# Patient Record
Sex: FEMALE | Race: WHITE | NOT HISPANIC OR LATINO | Employment: OTHER | URBAN - METROPOLITAN AREA
[De-identification: names, ages, dates, MRNs, and addresses within clinical notes are randomized per-mention and may not be internally consistent; named-entity substitution may affect disease eponyms.]

---

## 2018-09-23 ENCOUNTER — APPOINTMENT (OUTPATIENT)
Dept: ULTRASOUND IMAGING | Facility: CLINIC | Age: 78
End: 2018-09-23
Attending: EMERGENCY MEDICINE
Payer: COMMERCIAL

## 2018-09-23 ENCOUNTER — APPOINTMENT (OUTPATIENT)
Dept: GENERAL RADIOLOGY | Facility: CLINIC | Age: 78
End: 2018-09-23
Attending: EMERGENCY MEDICINE
Payer: COMMERCIAL

## 2018-09-23 ENCOUNTER — HOSPITAL ENCOUNTER (EMERGENCY)
Facility: CLINIC | Age: 78
Discharge: HOME | End: 2018-09-24
Attending: EMERGENCY MEDICINE | Admitting: EMERGENCY MEDICINE
Payer: COMMERCIAL

## 2018-09-23 DIAGNOSIS — M79.661 PAIN OF RIGHT LOWER LEG: ICD-10-CM

## 2018-09-23 DIAGNOSIS — R07.89 CHEST WALL PAIN: ICD-10-CM

## 2018-09-23 LAB
ANION GAP SERPL CALCULATED.3IONS-SCNC: 8 MMOL/L (ref 3–14)
BASOPHILS # BLD AUTO: 0 10E9/L (ref 0–0.2)
BASOPHILS NFR BLD AUTO: 0.3 %
BUN SERPL-MCNC: 16 MG/DL (ref 7–30)
CALCIUM SERPL-MCNC: 8.2 MG/DL (ref 8.5–10.1)
CHLORIDE SERPL-SCNC: 109 MMOL/L (ref 94–109)
CO2 SERPL-SCNC: 25 MMOL/L (ref 20–32)
CREAT SERPL-MCNC: 0.81 MG/DL (ref 0.52–1.04)
DIFFERENTIAL METHOD BLD: NORMAL
EOSINOPHIL # BLD AUTO: 0.2 10E9/L (ref 0–0.7)
EOSINOPHIL NFR BLD AUTO: 3.1 %
ERYTHROCYTE [DISTWIDTH] IN BLOOD BY AUTOMATED COUNT: 13.2 % (ref 10–15)
GFR SERPL CREATININE-BSD FRML MDRD: 68 ML/MIN/1.7M2
GLUCOSE SERPL-MCNC: 101 MG/DL (ref 70–99)
HCT VFR BLD AUTO: 41.3 % (ref 35–47)
HGB BLD-MCNC: 13.9 G/DL (ref 11.7–15.7)
IMM GRANULOCYTES # BLD: 0 10E9/L (ref 0–0.4)
IMM GRANULOCYTES NFR BLD: 0.3 %
LYMPHOCYTES # BLD AUTO: 2.1 10E9/L (ref 0.8–5.3)
LYMPHOCYTES NFR BLD AUTO: 27.4 %
MCH RBC QN AUTO: 31 PG (ref 26.5–33)
MCHC RBC AUTO-ENTMCNC: 33.7 G/DL (ref 31.5–36.5)
MCV RBC AUTO: 92 FL (ref 78–100)
MONOCYTES # BLD AUTO: 1.1 10E9/L (ref 0–1.3)
MONOCYTES NFR BLD AUTO: 13.8 %
NEUTROPHILS # BLD AUTO: 4.2 10E9/L (ref 1.6–8.3)
NEUTROPHILS NFR BLD AUTO: 55.1 %
NRBC # BLD AUTO: 0 10*3/UL
NRBC BLD AUTO-RTO: 0 /100
PLATELET # BLD AUTO: 234 10E9/L (ref 150–450)
POTASSIUM SERPL-SCNC: 3.9 MMOL/L (ref 3.4–5.3)
RBC # BLD AUTO: 4.48 10E12/L (ref 3.8–5.2)
SODIUM SERPL-SCNC: 142 MMOL/L (ref 133–144)
TROPONIN I SERPL-MCNC: <0.015 UG/L (ref 0–0.04)
WBC # BLD AUTO: 7.7 10E9/L (ref 4–11)

## 2018-09-23 PROCEDURE — 93971 EXTREMITY STUDY: CPT | Mod: RT

## 2018-09-23 PROCEDURE — 93005 ELECTROCARDIOGRAM TRACING: CPT

## 2018-09-23 PROCEDURE — 80048 BASIC METABOLIC PNL TOTAL CA: CPT | Performed by: EMERGENCY MEDICINE

## 2018-09-23 PROCEDURE — 85025 COMPLETE CBC W/AUTO DIFF WBC: CPT | Performed by: EMERGENCY MEDICINE

## 2018-09-23 PROCEDURE — 84484 ASSAY OF TROPONIN QUANT: CPT | Performed by: EMERGENCY MEDICINE

## 2018-09-23 PROCEDURE — 99285 EMERGENCY DEPT VISIT HI MDM: CPT | Mod: 25

## 2018-09-23 PROCEDURE — 71046 X-RAY EXAM CHEST 2 VIEWS: CPT

## 2018-09-23 RX ORDER — TRAMADOL HYDROCHLORIDE 50 MG/1
TABLET ORAL
COMMUNITY
Start: 2018-08-21

## 2018-09-23 RX ORDER — TOPIRAMATE 25 MG/1
TABLET, FILM COATED ORAL
COMMUNITY
Start: 2018-07-20

## 2018-09-23 RX ORDER — PRAMIPEXOLE DIHYDROCHLORIDE 0.5 MG/1
TABLET ORAL
COMMUNITY
Start: 2018-08-17

## 2018-09-23 RX ORDER — NYSTATIN 100000 [USP'U]/G
POWDER TOPICAL
COMMUNITY
Start: 2017-07-20

## 2018-09-23 RX ORDER — MINOCYCLINE HYDROCHLORIDE 100 MG/1
100 CAPSULE ORAL
COMMUNITY
Start: 2018-06-20

## 2018-09-23 RX ORDER — CYCLOSPORINE 0.5 MG/ML
EMULSION OPHTHALMIC
COMMUNITY
Start: 2018-03-01

## 2018-09-23 RX ORDER — HYDROCORTISONE 2.5 %
CREAM (GRAM) TOPICAL
COMMUNITY
Start: 2018-03-01

## 2018-09-23 RX ORDER — LORAZEPAM 0.5 MG/1
TABLET ORAL
COMMUNITY
Start: 2018-03-01

## 2018-09-23 RX ORDER — PANTOPRAZOLE SODIUM 40 MG/1
40 TABLET, DELAYED RELEASE ORAL
COMMUNITY
Start: 2018-05-15

## 2018-09-23 RX ORDER — ACETAMINOPHEN 500 MG
1000 TABLET ORAL ONCE
Status: DISCONTINUED | OUTPATIENT
Start: 2018-09-23 | End: 2018-09-24 | Stop reason: HOSPADM

## 2018-09-23 RX ORDER — METRONIDAZOLE 10 MG/G
GEL TOPICAL
COMMUNITY
Start: 2009-12-21

## 2018-09-23 RX ORDER — VALACYCLOVIR HYDROCHLORIDE 1 G/1
1 TABLET, FILM COATED ORAL
COMMUNITY
Start: 2018-06-20

## 2018-09-23 RX ORDER — GABAPENTIN 100 MG/1
CAPSULE ORAL
Status: ON HOLD | COMMUNITY
Start: 2018-03-01 | End: 2021-02-15

## 2018-09-23 RX ORDER — IBUPROFEN 200 MG
TABLET ORAL
COMMUNITY
Start: 2018-03-01

## 2018-09-23 RX ORDER — TRAZODONE HYDROCHLORIDE 100 MG/1
200 TABLET ORAL
COMMUNITY
Start: 2018-08-07

## 2018-09-23 ASSESSMENT — ENCOUNTER SYMPTOMS
MYALGIAS: 1
FEVER: 0
COUGH: 0

## 2018-09-23 NOTE — ED AVS SNAPSHOT
Emergency Department    [address] [place] [address]    Phone:  [phone]    Fax:  [phone]                                       Elvis Cruz   MRN: [id number]    Department:   Emergency Department   Date of Visit:  9/23/2018           After Visit Summary Signature Page     I have received my discharge instructions, and my questions have been answered. I have discussed any challenges I see with this plan with the nurse or doctor.    ..........................................................................................................................................  Patient/Patient Representative Signature      ..........................................................................................................................................  Patient Representative Print Name and Relationship to Patient    ..................................................               ................................................  Date                                   Time    ..........................................................................................................................................  Reviewed by Signature/Title    ...................................................              ..............................................  Date                                               Time          22EPIC Rev 08/18

## 2018-09-23 NOTE — ED AVS SNAPSHOT
Emergency Department    [address] [facility] [address]    Phone:  [phone]    Fax:  [phone]                                       Elvis Cruz   MRN: [id number]    Department:   Emergency Department   Date of Visit:  9/23/2018           Patient Information     Date Of Birth          1940        Your diagnoses for this visit were:     Chest wall pain     Pain of right lower leg        You were seen by Chato Hamilton MD.      Follow-up Information     Follow up with Mukesh Briggs MD. Schedule an appointment as soon as possible for a visit in 2 days.    Specialty:  Family Practice    Why:  For close follow up    Contact information:    [facility]  [address] [facility] [address]  [facility] [address]  [phone]          Go to  Emergency Department.    Specialty:  EMERGENCY MEDICINE    Why:  If symptoms worsen    Contact information:    [address] [facility] [address] [phone]        Discharge Instructions       Discharge Instructions  Chest Pain    You have been seen today for chest pain or discomfort.  At this time, your provider has found no signs that your chest pain is due to a serious or life-threatening condition, (or you have declined more testing and/or admission to the hospital). However, sometimes there is a serious problem that does not show up right away. Your evaluation today may not be complete and you may need further testing and evaluation.     Generally, every Emergency Department visit should have a follow-up clinic visit with either a primary or a specialty clinic/provider. Please follow-up as instructed by your emergency provider today.  Return to the Emergency Department if:    Your chest pain changes, gets worse, starts to happen more often, or comes with less activity.    You are newly short of breath.    You get very weak or tired.    You pass out or faint.    You have any new symptoms, like fever, cough, numb  legs, or you cough up blood.    You have anything else that worries you.    Until you follow-up with your regular provider, please do the following:    Take one aspirin daily unless you have an allergy or are told not to by your provider.    If a stress test appointment has been made, go to the appointment.    If you have questions, contact your regular provider.    Follow-up with your regular provider/clinic as directed; this is very important.    If you were given a prescription for medicine here today, be sure to read all of the information (including the package insert) that comes with your prescription.  This will include important information about the medicine, its side effects, and any warnings that you need to know about.  The pharmacist who fills the prescription can provide more information and answer questions you may have about the medicine.  If you have questions or concerns that the pharmacist cannot address, please call or return to the Emergency Department.       Remember that you can always come back to the Emergency Department if you are not able to see your regular provider in the amount of time listed above, if you get any new symptoms, or if there is anything that worries you.      24 Hour Appointment Hotline       To make an appointment at any [facility], call [phone] ([phone]). If you don't have a family doctor or clinic, we will help you find one. [facility] clinics are conveniently located to serve the needs of you and your family.             Review of your medicines      Our records show that you are taking the medicines listed below. If these are incorrect, please call your family doctor or clinic.        Dose / Directions Last dose taken    ALPRAZOLAM PO        Take  by mouth.   Refills:  0        cycloSPORINE 0.05 % ophthalmic emulsion   Commonly known as:  RESTASIS        2 (two) times a day. Both eyes   Refills:  0        diphenhydrAMINE-acetaminophen  MG  tablet   Commonly known as:  TYLENOL PM   Dose:  1 tablet        Take 1 tablet by mouth   Refills:  0        gabapentin 100 MG capsule   Commonly known as:  NEURONTIN        Refills:  0        hydrocortisone 2.5 % cream        Refills:  0        ibuprofen 200 MG tablet   Commonly known as:  ADVIL/MOTRIN        Refills:  0        LORazepam 0.5 MG tablet   Commonly known as:  ATIVAN        Refills:  0        metroNIDAZOLE 1 % gel   Commonly known as:  METROGEL        Refills:  0        * minocycline 100 MG capsule   Commonly known as:  MINOCIN/DYNACIN        Take  by mouth 2 times daily.   Refills:  0        * minocycline 100 MG capsule   Commonly known as:  MINOCIN/DYNACIN   Dose:  100 mg        Take 100 mg by mouth   Refills:  0        Multi-vitamin Tabs tablet   Dose:  1 tablet        Take 1 tablet by mouth daily.   Refills:  0        nystatin 585759 UNIT/GM Powd   Commonly known as:  MYCOSTATIN        Refills:  0        * PANTOPRAZOLE SODIUM PO        Take  by mouth.   Refills:  0        * pantoprazole 40 MG EC tablet   Commonly known as:  PROTONIX   Dose:  40 mg        Take 40 mg by mouth   Refills:  0        pramipexole 0.5 MG tablet   Commonly known as:  MIRAPEX        Refills:  0        ranitidine 300 MG tablet   Commonly known as:  ZANTAC   Dose:  300 mg        Take 300 mg by mouth   Refills:  0        ROPINIROLE HCL PO        Take  by mouth.   Refills:  0        topiramate 25 MG tablet   Commonly known as:  TOPAMAX        Take up to 100 MG daily.   Refills:  0        traMADol 50 MG tablet   Commonly known as:  ULTRAM        Refills:  0        traZODone 100 MG tablet   Commonly known as:  DESYREL   Dose:  200 mg        Take 200 mg by mouth   Refills:  0        valACYclovir 1000 mg tablet   Commonly known as:  VALTREX   Dose:  1 g        Take 1 g by mouth   Refills:  0        * Notice:  This list has 4 medication(s) that are the same as other medications prescribed for you. Read the directions carefully, and  ask your doctor or other care provider to review them with you.            Information about OPIOIDS     PRESCRIPTION OPIOIDS: WHAT YOU NEED TO KNOW   We gave you an opioid (narcotic) pain medicine. It is important to manage your pain, but opioids are not always the best choice. You should first try all the other options your care team gave you. Take this medicine for as short a time (and as few doses) as possible.    Some activities can increase your pain, such as bandage changes or therapy sessions. It may help to take your pain medicine 30 to 60 minutes before these activities. Reduce your stress by getting enough sleep, working on hobbies you enjoy and practicing relaxation or meditation. Talk to your care team about ways to manage your pain beyond prescription opioids.    These medicines have risks:    DO NOT drive when on new or higher doses of pain medicine. These medicines can affect your alertness and reaction times, and you could be arrested for driving under the influence (DUI). If you need to use opioids long-term, talk to your care team about driving.    DO NOT operate heavy machinery    DO NOT do any other dangerous activities while taking these medicines.    DO NOT drink any alcohol while taking these medicines.     If the opioid prescribed includes acetaminophen, DO NOT take with any other medicines that contain acetaminophen. Read all labels carefully. Look for the word  acetaminophen  or  Tylenol.  Ask your pharmacist if you have questions or are unsure.    You can get addicted to pain medicines, especially if you have a history of addiction (chemical, alcohol or substance dependence). Talk to your care team about ways to reduce this risk.    All opioids tend to cause constipation. Drink plenty of water and eat foods that have a lot of fiber, such as fruits, vegetables, prune juice, apple juice and high-fiber cereal. Take a laxative (Miralax, milk of magnesia, Colace, Senna) if you don t move your  bowels at least every other day. Other side effects include upset stomach, sleepiness, dizziness, throwing up, tolerance (needing more of the medicine to have the same effect), physical dependence and slowed breathing.    Store your pills in a secure place, locked if possible. We will not replace any lost or stolen medicine. If you don t finish your medicine, please throw away (dispose) as directed by your pharmacist. The [place] [employer]ution [employer] has more information about safe disposal: [email or web address]/living-[email or web address]/[email or web address]-medications        Procedures and tests performed during your visit     Basic metabolic panel    CBC with platelets differential    Peripheral IV catheter    Troponin I    US Lower Extremity Venous Duplex Right    XR Chest 2 Views      Orders Needing Specimen Collection     None      Pending Results     Date and Time Order Name Status Description    9/23/2018 2231 US Lower Extremity Venous Duplex Right Preliminary             Pending Culture Results     No orders found for last 3 day(s).            Pending Results Instructions     If you had any lab results that were not finalized at the time of your Discharge, you can call the ED Lab Result RN at [phone]. You will be contacted by this team for any positive Lab results or changes in treatment. The nurses are available 7 days a week from 10A to 6:30P.  You can leave a message 24 hours per day and they will return your call.        Test Results From Your Hospital Stay        9/23/2018 10:53 PM      Component Results     Component Value Ref Range & Units Status    WBC 7.7 4.0 - 11.0 10e9/L Final    RBC Count 4.48 3.8 - 5.2 10e12/L Final    Hemoglobin 13.9 11.7 - 15.7 g/dL Final    Hematocrit 41.3 35.0 - 47.0 % Final    MCV 92 78 - 100 fl Final    MCH 31.0 26.5 - 33.0 pg Final    MCHC 33.7 31.5 - 36.5 g/dL Final    RDW 13.2 10.0 - 15.0 % Final    Platelet Count 234 150 - 450 10e9/L Final    Diff Method Automated  Method  Final    % Neutrophils 55.1 % Final    % Lymphocytes 27.4 % Final    % Monocytes 13.8 % Final    % Eosinophils 3.1 % Final    % Basophils 0.3 % Final    % Immature Granulocytes 0.3 % Final    Nucleated RBCs 0 0 /100 Final    Absolute Neutrophil 4.2 1.6 - 8.3 10e9/L Final    Absolute Lymphocytes 2.1 0.8 - 5.3 10e9/L Final    Absolute Monocytes 1.1 0.0 - 1.3 10e9/L Final    Absolute Eosinophils 0.2 0.0 - 0.7 10e9/L Final    Absolute Basophils 0.0 0.0 - 0.2 10e9/L Final    Abs Immature Granulocytes 0.0 0 - 0.4 10e9/L Final    Absolute Nucleated RBC 0.0  Final         9/23/2018 11:06 PM      Component Results     Component Value Ref Range & Units Status    Sodium 142 133 - 144 mmol/L Final    Potassium 3.9 3.4 - 5.3 mmol/L Final    Chloride 109 94 - 109 mmol/L Final    Carbon Dioxide 25 20 - 32 mmol/L Final    Anion Gap 8 3 - 14 mmol/L Final    Glucose 101 (H) 70 - 99 mg/dL Final    Urea Nitrogen 16 7 - 30 mg/dL Final    Creatinine 0.81 0.52 - 1.04 mg/dL Final    GFR Estimate 68 >60 mL/min/1.7m2 Final    Non  GFR Calc    GFR Estimate If Black 82 >60 mL/min/1.7m2 Final    African American GFR Calc    Calcium 8.2 (L) 8.5 - 10.1 mg/dL Final         9/23/2018 11:11 PM      Component Results     Component Value Ref Range & Units Status    Troponin I ES <0.015 0.000 - 0.045 ug/L Final    The 99th percentile for upper reference range is 0.045 ug/L.  Troponin values   in the range of 0.045 - 0.120 ug/L may be associated with risks of adverse   clinical events.           9/23/2018 11:04 PM      Narrative     XR CHEST 2 VIEWS   9/23/2018 10:49 PM     HISTORY: Chest pain.     COMPARISON: 7/18/2013.     FINDINGS: Cardiomediastinal silhouette within normal limits. No focal  airspace opacities. No pleural effusion. No pneumothorax identified.  The bones are unremarkable.         Impression     IMPRESSION: No acute cardiopulmonary abnormalities.    TAMMI GORDON MD         9/23/2018 11:56 PM      Narrative      ULTRASOUND VENOUS RIGHT LOWER EXTREMITY WITH DOPPLER   9/23/2018 11:36  PM     HISTORY: Right leg pain.    COMPARISON: None.    TECHNIQUE: Spectral waveform and color Doppler evaluation were  performed.    FINDINGS: Normal compressibility of the right common femoral, femoral,  popliteal, posterior tibial, peroneal and greater saphenous veins.  Unremarkable Doppler waveform evaluation of the right common femoral,  femoral and popliteal veins.        Impression     IMPRESSION: No evidence of thrombus in the major veins of the right  lower extremity.                Clinical Quality Measure: Blood Pressure Screening     Your blood pressure was checked while you were in the emergency department today. The last reading we obtained was  BP: 154/70 . Please read the guidelines below about what these numbers mean and what you should do about them.  If your systolic blood pressure (the top number) is less than 120 and your diastolic blood pressure (the bottom number) is less than 80, then your blood pressure is normal. There is nothing more that you need to do about it.  If your systolic blood pressure (the top number) is 120-139 or your diastolic blood pressure (the bottom number) is 80-89, your blood pressure may be higher than it should be. You should have your blood pressure rechecked within a year by a primary care provider.  If your systolic blood pressure (the top number) is 140 or greater or your diastolic blood pressure (the bottom number) is 90 or greater, you may have high blood pressure. High blood pressure is treatable, but if left untreated over time it can put you at risk for heart attack, stroke, or kidney failure. You should have your blood pressure rechecked by a primary care provider within the next 4 weeks.  If your provider in the emergency department today gave you specific instructions to follow-up with your doctor or provider even sooner than that, you should follow that instruction and not wait for up  "to 4 weeks for your follow-up visit.        Thank you for choosing [facility]       Thank you for choosing [facility] for your care. Our goal is always to provide you with excellent care. Hearing back from our patients is one way we can continue to improve our services. Please take a few minutes to complete the written survey that you may receive in the mail after you visit with us. Thank you!        [email or web address] Information     [employer] lets you send messages to your doctor, view your test results, renew your prescriptions, schedule appointments and more. To sign up, go to [email or web address] . Click on \"Log in\" on the left side of the screen, which will take you to the Welcome page. Then click on \"Sign up Now\" on the right side of the page.     You will be asked to enter the access code listed below, as well as some personal information. Please follow the directions to create your username and password.     Your access code is: [email or web address]  Expires: 2018 12:17 AM     Your access code will  in 90 days. If you need help or a new code, please call your [facility] clinic or [phone].        Care EveryWhere ID     This is your Care EveryWhere ID. This could be used by other organizations to access your [facility] medical records  [id number]        Equal Access to Services     ALBERT FARNSWORTH AH: Grant Marquez, waaxda luqadaha, qaybta kaalmada adeamirah, rodolfo dickens. So [facility] [phone].    ATENCIÓN: Si habla español, tiene a morales disposición servicios gratuitos de asistencia lingüística. Llame al [phone].    We comply with applicable federal civil rights laws and [place] laws. We do not discriminate on the basis of race, color, national origin, age, disability, sex, sexual orientation, or gender identity.            After Visit Summary       This is your record. Keep this with you and show to your community pharmacist(s) and doctor(s) at your next visit.  "

## 2018-09-24 VITALS
DIASTOLIC BLOOD PRESSURE: 70 MMHG | SYSTOLIC BLOOD PRESSURE: 154 MMHG | OXYGEN SATURATION: 98 % | RESPIRATION RATE: 18 BRPM | TEMPERATURE: 98.3 F

## 2018-09-24 LAB — INTERPRETATION ECG - MUSE: NORMAL

## 2018-09-24 NOTE — ED PROVIDER NOTES
"  History     Chief Complaint:  Chest Pain     HPI   Elvis Cruz is a 77 year old female who presents to the emergency department today for evaluation of chest pain. The patient reports she has been having intermittent chest pain, and developed a stinging in her right lower leg. She says this could be due to her anxiety that she is having over her MRI and EEG tomorrow to evaluate her for ongoing unexplained \"blackout episodes.\" She notes she has had muscle tightness and tension in her right neck and shoulder then 30 minutes prior to arrival she developed right upper chest pain that is nonpleuritic, nonexertional and lasts for \"a minute\" at a time. Due to these symptoms she presented to the emergency department today. She says that deep breathes do not worsen her chest pain. She said there was no trauma or fall to have caused her chest or leg pain. She denies coughing, fever, and shortness of breath, syncope. She has no history of heart disease. Of note, she is currently not taking all of her medications after doctors request for the upcoming tests she is having done for her blackout episodes. She denies any syncope, fever or recent illness. She denies shortness of breath. She denies leg swelling or redness.       Allergies:  No Known Drug Allergies        Medications:    cycloSPORINE (RESTASIS) 0.05 % ophthalmic emulsion  diphenhydrAMINE-acetaminophen (TYLENOL PM)  MG tablet  gabapentin (NEURONTIN) 100 MG capsule  hydrocortisone 2.5 % cream  LORazepam (ATIVAN) 0.5 MG tablet  metroNIDAZOLE (METROGEL) 1 % gel  minocycline (MINOCIN/DYNACIN) 100 MG capsule  nystatin (MYCOSTATIN) 153078 UNIT/GM POWD  pantoprazole (PROTONIX) 40 MG EC tablet  pramipexole (MIRAPEX) 0.5 MG tablet  ranitidine (ZANTAC) 300 MG tablet  topiramate (TOPAMAX) 25 MG tablet  traMADol (ULTRAM) 50 MG tablet  traZODone (DESYREL) 100 MG tablet  valACYclovir (VALTREX) 1000 mg tablet  ALPRAZOLAM PO  minocycline (MINOCIN,DYNACIN) 100 MG " capsule  PANTOPRAZOLE SODIUM PO  ROPINIROLE HCL PO      Past Medical History:    Anxiety  Arthritis  DJD  GERD  Hepatitis  Hyperlipidemia  RLS      Past Surgical History:    Hysterectomy  Sinus surgery  Cataract removal  Tonsillectomy      Family History:    History reviewed. No pertinent family history.        Social History:  The patient was accompanied to the ED by herself.  Smoking Status: Former Smoker  Smokeless Tobacco: No  Alcohol Use: No   Marital Status:   [2]       Review of Systems   Constitutional: Negative for fever.   Respiratory: Negative for cough.    Cardiovascular: Positive for chest pain.   Musculoskeletal: Positive for myalgias (right leg stinging).   All other systems reviewed and are negative.      Physical Exam   First Vitals:  BP: (!) 210/97  Heart Rate: 62  Resp: 18  SpO2: 98 %    Physical Exam  General: Well appearing, nontoxic. Resting comfortably  Head:  Scalp, face, and head appear normal  Eyes:  Pupils are equal, round, and reactive to light    Conjunctivae non-injected and sclerae white  ENT:    The external nose is normal    Pinnae are normal    The oropharynx is normal, mucous membranes moist    Uvula is in the midline.   Neck:  Normal range of motion. Mild soreness over the soft tissues of the lower right neck. No overlying skin changes.     There is no rigidity noted    Trachea is in the midline  CV:  Regular rate and rhythm     Normal S1/S2, no S3/S4    No murmur or rub. Radial pulses 2+ bilaterally   Resp:  Lungs are clear and equal bilaterally    There is no tachypnea    No increased work of breathing    No rales, wheezing, or rhonchi  GI:  Abdomen is soft, no rigidity or guarding    No distension, or mass    No tenderness or rebound tenderness   MS:  Normal muscular tone. Mild tenderness to palpation to the right upper chest soft tissues. No skin changes, rash, crepitus or deformity. Right lower leg appears normal, no injury, erythema, swelling, edema. DP pulses 2+ and  intact bilaterally. SILT bilateral lower extremities.     Symmetric motor strength    No lower extremity edema  Skin:  No rash or acute skin lesions noted  Neuro: Awake and alert    Speech is normal and fluent    Moves all extremities spontaneously  Psych:  Normal affect.  Appropriate interactions.     Emergency Department Course     ECG:  Indication: chest pain   Completed at 2202.  Read at 2216  Normal sinus rhythm. Normal ECG.  Rate 61 bpm. NJ interval 192. QRS duration 82. QT/QTc 418/420. P-R-T axes 70 31 65.     Imaging:  Radiology findings were communicated with the patient who voiced understanding of the findings.    Chest X-Ray. 2 Views:   IMPRESSION: No acute cardiopulmonary abnormalities.  reading per radiology.      US Lower Extremity Venous Duplex  IMPRESSION: No evidence of thrombus in the major veins of the right  lower extremity.  Report per radiology        Laboratory:  Laboratory findings were communicated with the patient who voiced understanding of the findings.    CBC: WBC 7.7, HGB 13.9,   BMP: Glucose 101 (H), Calcium 8.2 (L) o/w WNL (Creatinine 0.81)   Troponin (Collected 2230): <0.015       Emergency Department Course:  Nursing notes and vitals reviewed.  2230 I performed an exam of the patient as documented above.   EKG obtained in the ED, see results above.    IV was inserted and blood was drawn for laboratory testing, results above.   The patient was sent for a Chest XR and US Venous Duplex while in the emergency department, results above.    2356 Patient rechecked and updated.      Findings and plan explained to the Patient. Patient discharged home with instructions regarding supportive care, medications, and reasons to return. The importance of close follow-up was reviewed.   I personally reviewed the laboratory and imaging results with the Patient and answered all related questions prior to  discharge.    Impression & Plan      Medical Decision Making:  Elvis Cruz is a 77  "year old female who presents with multiple concerns including intermittent right leg \"burning\" as well as intermittent right sided chest pains which are atypical, short lasting. Last less than a minute, and not provoked by exertion, breathing, or any other factors. Patient notes that she also has significant muscle tension and soreness in her right posterior neck and shoulder as well. On exam patient is well appearing. Lungs are clear. Broad differential diagnosis considered including pulmonary embolism, acute coronary ischemia, pulmonary edema, pneumonia, pneumothorax, chest wall pain, GI sources, DVT. Overall her symptoms are very atypical and I don't feel they're consistent with ACS or PE. EKG is non ischemic with no dysrhythmia. Initial troponin is negative. Given how atypical her symptoms are I wouldn't follow troponins as she is low risk and isn't currently having any pain. Laboratory studies are otherwise unremarkable. Chest X-Ray negative of any acute findings. Right lower extremity ultrasound was negative for DVT. Ultimately I feel that she is having chest wall pain as well as muscular pain in the right calf. I recommended warm compresses, tylenol and ibuprofen. Along with close follow up with primary care. Patient should return for any worsening chest pain, shortness of breath, fevers, or any other worsening of her condition. Patient is agreeable with plan, and is stable for discharge.  Return precautions were discussed with patient. The patient's questions were answered and the patient was agreeable with discharge.     Diagnosis:    ICD-10-CM    1. Chest wall pain R07.89    2. Pain of right lower leg M79.661        Disposition:  Discharged to home.     Scribe Disclosure:  I, Elena Vines, am serving as a scribe at 10:18 PM on 9/23/2018 to document services personally performed by Chato Hamilton MD based on my observations and the provider's statements to me.    Elena Vines  9/23/2018   [facility] EMERGENCY " DEPARTMENT       [facility]Chato MD  09/24/18 [id number]

## 2019-03-11 ENCOUNTER — TRANSFERRED RECORDS (OUTPATIENT)
Dept: HEALTH INFORMATION MANAGEMENT | Facility: CLINIC | Age: 79
End: 2019-03-11

## 2019-09-05 ENCOUNTER — HOSPITAL ENCOUNTER (OUTPATIENT)
Dept: GENERAL RADIOLOGY | Facility: CLINIC | Age: 79
Discharge: HOME | End: 2019-09-05
Attending: INTERNAL MEDICINE | Admitting: INTERNAL MEDICINE
Payer: COMMERCIAL

## 2019-09-05 ENCOUNTER — HOSPITAL ENCOUNTER (OUTPATIENT)
Dept: SPEECH THERAPY | Facility: CLINIC | Age: 79
End: 2019-09-05
Payer: COMMERCIAL

## 2019-09-05 DIAGNOSIS — R13.10 DYSPHAGIA, UNSPECIFIED TYPE: ICD-10-CM

## 2019-09-05 PROCEDURE — 74230 X-RAY XM SWLNG FUNCJ C+: CPT

## 2019-09-05 PROCEDURE — 92610 EVALUATE SWALLOWING FUNCTION: CPT | Mod: GN | Performed by: SPEECH-LANGUAGE PATHOLOGIST

## 2019-09-05 PROCEDURE — 92611 MOTION FLUOROSCOPY/SWALLOW: CPT | Mod: GN | Performed by: SPEECH-LANGUAGE PATHOLOGIST

## 2019-09-05 RX ORDER — BARIUM SULFATE 400 MG/ML
SUSPENSION ORAL ONCE
Status: DISCONTINUED | OUTPATIENT
Start: 2019-09-05 | End: 2019-09-05 | Stop reason: CLARIF

## 2019-09-05 NOTE — PROGRESS NOTES
09/05/19 1208   General Information   Type Of Visit Initial   Start Of Care Date 09/05/19   Referring Physician Frank Arshad MD   Orders Other   Orders Comment Video swallow study with speech   Medical Diagnosis Dysphagia   Onset Of Illness/injury Or Date Of Surgery 09/05/19   Precautions/limitations No Known Precautions/limitations   Hearing [place]   Pertinent History of Current Problem/OT: Additional Occupational Profile Info Ms. Leal is a pleasant 78 year old female who was referred for a video swallow study due to complaints of choking on meats and lettuce. She reports at times food comes back up. Also reports alot of post nasal drainage and coughing up phelgm in the morning. PMH: GERDS, DJD, anxiety, arthritis, hepatitis.    Respiratory Status Room air   Prior Level Of Function Swallowing   Prior Level Of Function Comment Consumes a regular diet and thin liquids.    Patient Role/employment History Retired   Clinical Swallow Evaluation   Oral Musculature generally intact   Structural Abnormalities none present   Dentition present and adequate   Mucosal Quality adequate   Mandibular Strength and Mobility intact   Oral Labial Strength and Mobility WFL   Lingual Strength and Mobility WFL   Velar Elevation intact   Buccal Strength and Mobility intact   Laryngeal Function Cough;Throat clear;Swallow;Voicing initiated;Dry swallow palpated   Additional evaluation(s) completed today Yes   Rationale for completing additional evaluation Complete a video swallow study to fully assess pharyngeal function.    VFSS Eval: Radiology   Radiologist Dr. Diaz   Views Taken left lateral   Physical Location of Procedure [facility]   VFSS Eval: Thin Liquid Texture Trial   Mode of Presentation, Thin Liquid cup;straw;self-fed   Preparatory Phase WFL   Oral Phase, Thin Liquid WFL   Pharyngeal Phase, Thin Liquid WFL   Rosenbek's Penetration Aspiration Scale: Thin Liquid Trial Results 1 - no aspiration, contrast does not enter  airway   Diagnostic Statement Mild cricopharyngeus muscle.    VFSS Eval: Puree Solid Texture Trial   Mode of Presentation, Puree spoon;self-fed   Preparatory Phase WFL   Oral Phase, Puree WFL   Pharyngeal Phase, Puree WFL   Rosenbek's Penetration Aspiration Scale: Puree Food Trial Results 1 - no aspiration, contrast does not enter airway   VFSS Eval: Semisolid Texture Trial   Mode of Presentation, Semisolid spoon;self-fed   Preparatory Phase WFL   Oral Phase, Semisolid WFL   Pharyngeal Phase, Semisolid WFL   Rosenbek's Penetration Aspiration Scale: Semisolid Food Trial Results 1 - no aspiration, contrast does not enter airway   VFSS Eval: Solid Food Texture Trial   Mode of Presentation, Solid spoon;self-fed   Preparatory Phase WFL   Oral Phase, Solid WFL   Pharyngeal Phase, Solid WFL   Rosenbek's Penetration Aspiration Scale: Solid Food Trial Results 1 - no aspiration, contrast does not enter airway   Educational Assessment   Barriers to Learning Hearing   Esophageal Phase of Swallow   Patient reports or presents with symptoms of esophageal dysphagia Yes   Esophageal sweep performed during today s vidofluoroscopic exam  Please refer to radiologist's report for details   Swallow Eval: Clinical Impressions   Skilled Criteria for Therapy Intervention No problems identified which require skilled intervention   Functional Assessment Scale (FAS) 7   Dysphagia Outcome Severity Scale (BREE) Level 7 - BREE   Treatment Diagnosis Normal swallow   Diet texture recommendations Regular diet;Thin liquids   Recommended Feeding/Eating Techniques alternate between small bites and sips of food/liquid;check mouth frequently for oral residue/pocketing;maintain upright posture during/after eating for 30 mins;small sips/bites   Anticipated Discharge Disposition home   Risks and Benefits of Treatment have been explained. Yes   Patient, family and/or staff in agreement with Plan of Care Yes   Clinical Impression Comments Ms. Cruz  presents with an intact oral and pharyngeal swallow mechanism on today's study. No penetration/aspiration or residue. She did have some mild prominance of the crico-pharyngeus muscle without obstruction. Recommend: 1. May continue on a regular diet and thin liquids. 2. Follow standard GERDS precautions. 3. Follow up with GI.    Total Session Time   SLP Eval: oral/pharyngeal swallow function, clinical minutes (86998) 10   SLP Eval: VideoFluoroscopic Swallow function Minutes (12435) 15   Total Evaluation Time 25

## 2020-07-21 ENCOUNTER — APPOINTMENT (OUTPATIENT)
Age: 80
Setting detail: DERMATOLOGY
End: 2020-07-21

## 2020-07-21 VITALS — RESPIRATION RATE: 14 BRPM | HEIGHT: 67 IN | WEIGHT: 200 LBS

## 2020-07-21 DIAGNOSIS — L21.8 OTHER SEBORRHEIC DERMATITIS: ICD-10-CM

## 2020-07-21 DIAGNOSIS — B35.1 TINEA UNGUIUM: ICD-10-CM

## 2020-07-21 DIAGNOSIS — L65.9 NONSCARRING HAIR LOSS, UNSPECIFIED: ICD-10-CM

## 2020-07-21 PROCEDURE — OTHER COUNSELING: OTHER

## 2020-07-21 PROCEDURE — OTHER ORDER TESTS: OTHER

## 2020-07-21 PROCEDURE — 36415 COLL VENOUS BLD VENIPUNCTURE: CPT

## 2020-07-21 PROCEDURE — 99214 OFFICE O/P EST MOD 30 MIN: CPT | Mod: 25

## 2020-07-21 PROCEDURE — 88305 TISSUE EXAM BY PATHOLOGIST: CPT

## 2020-07-21 PROCEDURE — OTHER ADDITIONAL NOTES: OTHER

## 2020-07-21 PROCEDURE — OTHER VENIPUNCTURE: OTHER

## 2020-07-21 PROCEDURE — OTHER PATHOLOGY BILLING: OTHER

## 2020-07-21 PROCEDURE — OTHER NAIL CLIPPING FOR PATHOLOGY: OTHER

## 2020-07-21 PROCEDURE — OTHER PRESCRIPTION: OTHER

## 2020-07-21 RX ORDER — KETOCONAZOLE 20 MG/G
2% CREAM TOPICAL BID
Qty: 1 | Refills: 4 | Status: ERX | COMMUNITY
Start: 2020-07-21

## 2020-07-21 RX ORDER — DESONIDE 0.5 MG/G
0.05% CREAM TOPICAL BID
Qty: 1 | Refills: 2 | Status: ERX | COMMUNITY
Start: 2020-07-21

## 2020-07-21 RX ORDER — FINASTERIDE 5 MG/1
5 MG TABLET, FILM COATED ORAL QD
Qty: 90 | Refills: 3 | Status: ERX | COMMUNITY
Start: 2020-07-21

## 2020-07-21 ASSESSMENT — LOCATION DETAILED DESCRIPTION DERM
LOCATION DETAILED: RIGHT CENTRAL EYEBROW
LOCATION DETAILED: RIGHT GREAT TOENAIL
LOCATION DETAILED: LEFT SUPERIOR PARIETAL SCALP
LOCATION DETAILED: LEFT CENTRAL EYEBROW

## 2020-07-21 ASSESSMENT — LOCATION SIMPLE DESCRIPTION DERM
LOCATION SIMPLE: SCALP
LOCATION SIMPLE: RIGHT EYEBROW
LOCATION SIMPLE: RIGHT GREAT TOE
LOCATION SIMPLE: LEFT EYEBROW

## 2020-07-21 ASSESSMENT — LOCATION ZONE DERM
LOCATION ZONE: SCALP
LOCATION ZONE: FACE
LOCATION ZONE: TOENAIL

## 2020-07-21 NOTE — PROCEDURE: PATHOLOGY BILLING
Immunohistochemistry (38906 and 50064) billing is not performed here. Please use the Immunohistochemistry Stain Billing plan to accomplish this. Immunohistochemistry (03912 and 91658) billing is not performed here. Please use the Immunohistochemistry Stain Billing plan to accomplish this.

## 2020-07-21 NOTE — PROCEDURE: VENIPUNCTURE
Bill For Individual Tests Below?: no
Number Of Tubes Drawn: 3
Detail Level: Detailed
Venipuncture Paragraph: An alcohol pad was applied to the venipuncture site. Venipuncture was performed using a butterfly needle. Pressure and a bandaid was applied to the site. No complications were noted.

## 2021-01-30 DIAGNOSIS — Z11.59 ENCOUNTER FOR SCREENING FOR OTHER VIRAL DISEASES: ICD-10-CM

## 2021-02-12 DIAGNOSIS — Z11.59 ENCOUNTER FOR SCREENING FOR OTHER VIRAL DISEASES: ICD-10-CM

## 2021-02-12 LAB
LABORATORY COMMENT REPORT: NORMAL
SARS-COV-2 RNA RESP QL NAA+PROBE: NEGATIVE
SARS-COV-2 RNA RESP QL NAA+PROBE: NORMAL
SPECIMEN SOURCE: NORMAL
SPECIMEN SOURCE: NORMAL

## 2021-02-12 PROCEDURE — U0003 INFECTIOUS AGENT DETECTION BY NUCLEIC ACID (DNA OR RNA); SEVERE ACUTE RESPIRATORY SYNDROME CORONAVIRUS 2 (SARS-COV-2) (CORONAVIRUS DISEASE [COVID-19]), AMPLIFIED PROBE TECHNIQUE, MAKING USE OF HIGH THROUGHPUT TECHNOLOGIES AS DESCRIBED BY CMS-2020-01-R: HCPCS | Performed by: INTERNAL MEDICINE

## 2021-02-12 PROCEDURE — U0005 INFEC AGEN DETEC AMPLI PROBE: HCPCS | Performed by: INTERNAL MEDICINE

## 2021-02-15 ENCOUNTER — ANESTHESIA EVENT (OUTPATIENT)
Dept: GASTROENTEROLOGY | Facility: CLINIC | Age: 81
End: 2021-02-15
Payer: COMMERCIAL

## 2021-02-15 ENCOUNTER — ANESTHESIA (OUTPATIENT)
Dept: GASTROENTEROLOGY | Facility: CLINIC | Age: 81
End: 2021-02-15
Payer: COMMERCIAL

## 2021-02-15 ENCOUNTER — HOSPITAL ENCOUNTER (OUTPATIENT)
Facility: CLINIC | Age: 81
Discharge: HOME | End: 2021-02-15
Attending: INTERNAL MEDICINE | Admitting: INTERNAL MEDICINE
Payer: COMMERCIAL

## 2021-02-15 VITALS
TEMPERATURE: 97.8 F | OXYGEN SATURATION: 100 % | SYSTOLIC BLOOD PRESSURE: 140 MMHG | HEART RATE: 51 BPM | BODY MASS INDEX: 31.39 KG/M2 | RESPIRATION RATE: 10 BRPM | WEIGHT: 200 LBS | DIASTOLIC BLOOD PRESSURE: 66 MMHG | HEIGHT: 67 IN

## 2021-02-15 LAB — COLONOSCOPY: NORMAL

## 2021-02-15 PROCEDURE — 250N000011 HC RX IP 250 OP 636: Performed by: NURSE ANESTHETIST, CERTIFIED REGISTERED

## 2021-02-15 PROCEDURE — 88305 TISSUE EXAM BY PATHOLOGIST: CPT | Mod: TC | Performed by: INTERNAL MEDICINE

## 2021-02-15 PROCEDURE — 999N000010 HC STATISTIC ANES STAT CODE-CRNA PER MINUTE: Performed by: INTERNAL MEDICINE

## 2021-02-15 PROCEDURE — 258N000003 HC RX IP 258 OP 636: Performed by: NURSE ANESTHETIST, CERTIFIED REGISTERED

## 2021-02-15 PROCEDURE — 45385 COLONOSCOPY W/LESION REMOVAL: CPT | Performed by: INTERNAL MEDICINE

## 2021-02-15 PROCEDURE — 88305 TISSUE EXAM BY PATHOLOGIST: CPT | Mod: 26 | Performed by: PATHOLOGY

## 2021-02-15 PROCEDURE — 370N000017 HC ANESTHESIA TECHNICAL FEE, PER MIN: Performed by: INTERNAL MEDICINE

## 2021-02-15 PROCEDURE — 250N000009 HC RX 250: Performed by: NURSE ANESTHETIST, CERTIFIED REGISTERED

## 2021-02-15 RX ORDER — ONDANSETRON 4 MG/1
4 TABLET, ORALLY DISINTEGRATING ORAL EVERY 30 MIN PRN
Status: DISCONTINUED | OUTPATIENT
Start: 2021-02-15 | End: 2021-02-15 | Stop reason: HOSPADM

## 2021-02-15 RX ORDER — FUROSEMIDE 20 MG
20 TABLET ORAL DAILY
COMMUNITY

## 2021-02-15 RX ORDER — NALOXONE HYDROCHLORIDE 0.4 MG/ML
0.4 INJECTION, SOLUTION INTRAMUSCULAR; INTRAVENOUS; SUBCUTANEOUS
Status: DISCONTINUED | OUTPATIENT
Start: 2021-02-15 | End: 2021-02-15 | Stop reason: HOSPADM

## 2021-02-15 RX ORDER — ATORVASTATIN CALCIUM 10 MG/1
10 TABLET, FILM COATED ORAL DAILY
COMMUNITY

## 2021-02-15 RX ORDER — NALOXONE HYDROCHLORIDE 0.4 MG/ML
0.2 INJECTION, SOLUTION INTRAMUSCULAR; INTRAVENOUS; SUBCUTANEOUS
Status: DISCONTINUED | OUTPATIENT
Start: 2021-02-15 | End: 2021-02-15 | Stop reason: HOSPADM

## 2021-02-15 RX ORDER — SODIUM CHLORIDE, SODIUM LACTATE, POTASSIUM CHLORIDE, CALCIUM CHLORIDE 600; 310; 30; 20 MG/100ML; MG/100ML; MG/100ML; MG/100ML
INJECTION, SOLUTION INTRAVENOUS CONTINUOUS PRN
Status: DISCONTINUED | OUTPATIENT
Start: 2021-02-15 | End: 2021-02-15

## 2021-02-15 RX ORDER — SODIUM CHLORIDE, SODIUM LACTATE, POTASSIUM CHLORIDE, CALCIUM CHLORIDE 600; 310; 30; 20 MG/100ML; MG/100ML; MG/100ML; MG/100ML
INJECTION, SOLUTION INTRAVENOUS CONTINUOUS
Status: DISCONTINUED | OUTPATIENT
Start: 2021-02-15 | End: 2021-02-15 | Stop reason: HOSPADM

## 2021-02-15 RX ORDER — ONDANSETRON 2 MG/ML
INJECTION INTRAMUSCULAR; INTRAVENOUS PRN
Status: DISCONTINUED | OUTPATIENT
Start: 2021-02-15 | End: 2021-02-15

## 2021-02-15 RX ORDER — TEMAZEPAM 15 MG/1
15 CAPSULE ORAL
COMMUNITY

## 2021-02-15 RX ORDER — ONDANSETRON 2 MG/ML
4 INJECTION INTRAMUSCULAR; INTRAVENOUS EVERY 30 MIN PRN
Status: DISCONTINUED | OUTPATIENT
Start: 2021-02-15 | End: 2021-02-15 | Stop reason: HOSPADM

## 2021-02-15 RX ORDER — PROPOFOL 10 MG/ML
INJECTION, EMULSION INTRAVENOUS PRN
Status: DISCONTINUED | OUTPATIENT
Start: 2021-02-15 | End: 2021-02-15

## 2021-02-15 RX ORDER — PROPOFOL 10 MG/ML
INJECTION, EMULSION INTRAVENOUS CONTINUOUS PRN
Status: DISCONTINUED | OUTPATIENT
Start: 2021-02-15 | End: 2021-02-15

## 2021-02-15 RX ADMIN — SODIUM CHLORIDE, POTASSIUM CHLORIDE, SODIUM LACTATE AND CALCIUM CHLORIDE: 600; 310; 30; 20 INJECTION, SOLUTION INTRAVENOUS at 10:22

## 2021-02-15 RX ADMIN — DEXMEDETOMIDINE HYDROCHLORIDE 12 MCG: 100 INJECTION, SOLUTION INTRAVENOUS at 10:22

## 2021-02-15 RX ADMIN — PROPOFOL 125 MCG/KG/MIN: 10 INJECTION, EMULSION INTRAVENOUS at 10:22

## 2021-02-15 RX ADMIN — ONDANSETRON 4 MG: 2 INJECTION INTRAMUSCULAR; INTRAVENOUS at 10:23

## 2021-02-15 RX ADMIN — PROPOFOL 20 MG: 10 INJECTION, EMULSION INTRAVENOUS at 10:23

## 2021-02-15 ASSESSMENT — MIFFLIN-ST. JEOR: SCORE: 1398.47

## 2021-02-15 ASSESSMENT — ENCOUNTER SYMPTOMS: STRIDOR: 0

## 2021-02-15 NOTE — ANESTHESIA POSTPROCEDURE EVALUATION
Patient: Elvis Cruz    Procedure(s):  COLONOSCOPY, FLEXIBLE, WITH LESION REMOVAL USING SNARE    Diagnosis:Rectal bleeding [K62.5]  Diagnosis Additional Information: No value filed.    Anesthesia Type:  MAC    Note:  Disposition: Outpatient   Postop Pain Control: Uneventful            Sign Out: Well controlled pain   PONV: No   Neuro/Psych: Uneventful            Sign Out: Acceptable/Baseline neuro status   Airway/Respiratory: Uneventful            Sign Out: Acceptable/Baseline resp. status   CV/Hemodynamics: Uneventful            Sign Out: Acceptable CV status   Other NRE: NONE   DID A NON-ROUTINE EVENT OCCUR? No         Last vitals:  Vitals:    02/15/21 1100 02/15/21 1110 02/15/21 1120   BP: 134/74 130/74 (!) 140/66   Pulse: (!) 47 58 51   Resp: 14 20 10   Temp:      SpO2: 99% 100% 100%       Last vitals prior to Anesthesia Care Transfer:  CRNA VITALS  2/15/2021 1023 - 2/15/2021 1123      2/15/2021             SpO2:  100 %    Resp Rate (set):  10          Electronically Signed By: Armando Franco MD  February 15, 2021  12:18 PM

## 2021-02-15 NOTE — ANESTHESIA CARE TRANSFER NOTE
Patient: Elvis Cruz    Procedure(s):  COLONOSCOPY, FLEXIBLE, WITH LESION REMOVAL USING SNARE    Diagnosis: Rectal bleeding [K62.5]  Diagnosis Additional Information: No value filed.    Anesthesia Type:   MAC     Note:    Oropharynx: oropharynx clear of all foreign objects  Level of Consciousness: drowsy  Oxygen Supplementation: room air    Independent Airway: airway patency satisfactory and stable  Dentition: dentition unchanged  Vital Signs Stable: post-procedure vital signs reviewed and stable  Report to RN Given: handoff report given  Patient transferred to: Phase II  Comments: After procedure in [facility] under monitored anesthesia care (MAC), patient exhibited spontaneous respirations, patient breathing room air with oxygen saturation maintained greater than 98%, patient brought to [facility] recovery bay for postprocedure recovery, SpO2, NiBP, and EKG monitors and alarms on and functioning, RN questions answered.    /68  HR 54  RR 16  O2 99%        Handoff Report: Identifed the Patient, Identified the Reponsible Provider, Reviewed the pertinent medical history, Discussed the surgical course, Reviewed Intra-OP anesthesia mangement and issues during anesthesia, Set expectations for post-procedure period and Allowed opportunity for questions and acknowledgement of understanding      Vitals: (Last set prior to Anesthesia Care Transfer)  CRNA VITALS  2/15/2021 1023 - 2/15/2021 1056      2/15/2021             SpO2:  100 %    Resp Rate (set):  10        Electronically Signed By: Christine Marie Volp Hodgkins, CRNA, APRN CRNA  February 15, 2021  10:56 AM

## 2021-02-15 NOTE — H&P
[facility] - Pre-Endoscopy History and Physical     Elvis Cruz MRN# [id number]   YOB: 1940 Age: 80 year old     Date of Procedure: 2/15/2021  Primary care provider: Jb Oliveira  Type of Endoscopy: Colonoscopy   Reason for Procedure: rectal bleeding  Type of Anesthesia Anticipated: MAC    HPI:    Elvis is a 80 year old female who will be undergoing the above procedure.      A history and physical has been performed, 21, reviewed.     The patient's medications and allergies have been reviewed. The risks and benefits of the procedure and the sedation options and risks were discussed with the patient.  All questions were answered and informed consent was obtained.      She denies a personal or family history of anesthesia complications or bleeding disorders.     Patient Active Problem List   Diagnosis     Hepatitis, autoimmune (H)     Hyperlipidemia, mixed     GERD (gastroesophageal reflux disease)     DJD (degenerative joint disease), lumbar        Past Medical History:   Diagnosis Date     Anxiety      Arthritis      DJD (degenerative joint disease), lumbar     foraminal stenosis     GERD (gastroesophageal reflux disease)      Hepatitis, autoimmune (H)      Hyperlipidemia, mixed      Pulmonary embolism (H)      Restless leg         Past Surgical History:   Procedure Laterality Date     BACK SURGERY       CARDIAC SURGERY      pt has LINQ recorder, monitoring for arrthymias     GYN SURGERY      hysterectomy     OTHER SURGICAL HISTORY      sinus     OTHER SURGICAL HISTORY      back     OTHER SURGICAL HISTORY      cataract removal - bilateral     TONSILLECTOMY         Social History     Tobacco Use     Smoking status: Former Smoker     Quit date: 1976     Years since quittin.8     Smokeless tobacco: Never Used   Substance Use Topics     Alcohol use: No     Alcohol/week: 0.0 standard drinks       History reviewed. No pertinent family history.    Prior to Admission  medications    Medication Sig Start Date End Date Taking? Authorizing Provider   atorvastatin (LIPITOR) 10 MG tablet Take 10 mg by mouth daily   Yes Reported, Patient   minocycline (MINOCIN,DYNACIN) 100 MG capsule Take  by mouth 2 times daily.   Yes Reported, Patient   temazepam (RESTORIL) 15 MG capsule Take 15 mg by mouth nightly as needed for sleep   Yes Reported, Patient   topiramate (TOPAMAX) 25 MG tablet Take up to 100 MG daily. 7/20/18  Yes Reported, Patient   cycloSPORINE (RESTASIS) 0.05 % ophthalmic emulsion 2 (two) times a day. Both eyes 3/1/18   Reported, Patient   diphenhydrAMINE-acetaminophen (TYLENOL PM)  MG tablet Take 1 tablet by mouth 4/15/15   Reported, Patient   furosemide (LASIX) 20 MG tablet Take 20 mg by mouth daily    Reported, Patient   hydrocortisone 2.5 % cream  3/1/18   Reported, Patient   ibuprofen (ADVIL/MOTRIN) 200 MG tablet  3/1/18   Reported, Patient   LORazepam (ATIVAN) 0.5 MG tablet  3/1/18   Reported, Patient   metroNIDAZOLE (METROGEL) 1 % gel  12/21/09   Reported, Patient   minocycline (MINOCIN/DYNACIN) 100 MG capsule Take 100 mg by mouth 6/20/18   Reported, Patient   multivitamin, therapeutic with minerals (MULTI-VITAMIN) TABS Take 1 tablet by mouth daily.    Reported, Patient   nystatin (MYCOSTATIN) 946584 UNIT/GM POWD  7/20/17   Reported, Patient   pantoprazole (PROTONIX) 40 MG EC tablet Take 40 mg by mouth 5/15/18   Reported, Patient   PANTOPRAZOLE SODIUM PO Take  by mouth.    Reported, Patient   pramipexole (MIRAPEX) 0.5 MG tablet  8/17/18   Reported, Patient   ROPINIROLE HCL PO Take  by mouth.    Reported, Patient   traMADol (ULTRAM) 50 MG tablet  8/21/18   Reported, Patient   traZODone (DESYREL) 100 MG tablet Take 200 mg by mouth 8/7/18   Reported, Patient   valACYclovir (VALTREX) 1000 mg tablet Take 1 g by mouth 6/20/18   Reported, Patient       Allergies   Allergen Reactions     Adhesive Tape Unknown and Other (See Comments)     Skin breakdown        Celecoxib Nausea  "and Vomiting     No Known Drug Allergies         REVIEW OF SYSTEMS:   A comprehensive ROS was negative    PHYSICAL EXAM:   Temp 97.8  F (36.6  C)   Ht 1.692 m (5' 6.6\")   Wt 90.7 kg (200 lb)   BMI 31.70 kg/m   Estimated body mass index is 31.7 kg/m  as calculated from the following:    Height as of this encounter: 1.692 m (5' 6.6\").    Weight as of this encounter: 90.7 kg (200 lb).   GENERAL APPEARANCE: alert, and oriented  MENTAL STATUS: alert  RESP: lungs clear to auscultation - no rales, rhonchi or wheezes  CV: regular rates and rhythm      IMPRESSION   Rectal bleeding    PLAN:   Colonoscopy    The above has been forwarded to the consulting provider.      Signed Electronically by: Luis Nowak DO  February 15, 2021              "

## 2021-02-15 NOTE — ANESTHESIA PREPROCEDURE EVALUATION
Anesthesia Pre-Procedure Evaluation    Patient: Elvis Cruz   MRN: [id number] : 1940        Preoperative Diagnosis: Rectal bleeding [K62.5]   Procedure : Procedure(s):  COLONOSCOPY     Past Medical History:   Diagnosis Date     Anxiety      Arthritis      DJD (degenerative joint disease), lumbar     foraminal stenosis     GERD (gastroesophageal reflux disease)      Hepatitis, autoimmune (H)      Hyperlipidemia, mixed      Restless leg       Past Surgical History:   Procedure Laterality Date     GYN SURGERY      hysterectomy     OTHER SURGICAL HISTORY      sinus     OTHER SURGICAL HISTORY      back     OTHER SURGICAL HISTORY      cataract removal - bilateral     TONSILLECTOMY        Allergies   Allergen Reactions     Adhesive Tape Unknown and Other (See Comments)     Skin breakdown        Celecoxib Nausea and Vomiting     No Known Drug Allergies       Social History     Tobacco Use     Smoking status: Former Smoker     Quit date: 1976     Years since quittin.8     Smokeless tobacco: Never Used   Substance Use Topics     Alcohol use: No     Alcohol/week: 0.0 standard drinks      Wt Readings from Last 1 Encounters:   16 90.3 kg (199 lb)        Anesthesia Evaluation            ROS/MED HX  ENT/Pulmonary: Comment: Chronic cough, sinus drainage - denies asthma/COPD    (+) sleep apnea, uses CPAP, allergic rhinitis,     Neurologic:       Cardiovascular:     (+) Dyslipidemia ----- (-) wheezes   METS/Exercise Tolerance:     Hematologic:       Musculoskeletal:       GI/Hepatic:     (+) GERD, hepatitis (past history of autoimmune hepatitis, resolved)     Renal/Genitourinary:       Endo:     (+) Obesity,     Psychiatric/Substance Use:     (+) psychiatric history anxiety     Infectious Disease:       Malignancy:       Other:            Physical Exam    Airway  airway exam normal      Mallampati: II   TM distance: > 3 FB   Neck ROM: full   Mouth opening: > 3 cm    Respiratory Devices and Support          Dental  no notable dental history         Cardiovascular   cardiovascular exam normal          Pulmonary   pulmonary exam normal        (-) no rhonchi, no wheezes, no rales and no stridor          ECHO STRESS WO CONTRAST2020  [employer] [facility]  Component Name Value Ref Range   PEAK TR VELOCITY 2.4 m/s   LVEDD 4.57 cm   Other Result Information   This result has an attachment that is not available.   Result Narrative         [facility]AST2020  [facility]  Component Name Value Ref Range   PEAK TR VELOCITY 2.4 m/s   LVEDD 4.57 cm   Other Result Information   This result has an attachment that is not available.   Result Narrative     STRESS ECHOCARDIOGRAM      DIANA MEZA  MRN:    [id number]          Accession#:   [id number]  :    1940 79 years Study Date:   2020 9:13:11 AM  Gender: F                   BP:           134/80 mmHg  Height: 167.00 cm           BSA:          1.99 mÂ   Weight: 89.70 kg            Tech:         TIM                              Referring MD: BERNADETTE RAMOS  Site:             [facility]  Reading Location: [facility]      Procedure: Stress Echo, Color Doppler and Limited Spectral Doppler. Ian stress echo.    Indication for study: CP  Cardiac Rhythm: Normal sinus.Study quality: Fair.    Final Impressions:   1. Negative stress echo for ischemia.   2. There were no ischemic changes by EKG during stress.   3. Post stress, normal left ventricular size, increased global systolic function with an estimated EF of >75%.   4. Maximum stress test with 87.3% of age predicted maximum heart rate achieved.   5. During stress exam the patient developed dizziness.   6. Hypertensive BP response.    Stress Data:                                           HR    Systolic Diastolic  Time Duration Minutes Seconds Baseline 61 bpm    134    80 mmHg                      5 :30     Peak     122 bpm    190    90 mmHg    Max Pred HR    140  % of Max       87%   Duke Treadmill Score 3  Double Product [id number]      Echo Findings:This is a negative stress echo test for ischemia. Post stress, normal left ventricular size, increased global systolic function with an estimated EF of >75%.    EKG:During exercise, the patient developed sinus tachycardia and with premature atrial contractions rhythm with first degree AV block. There were no ischemic changes by EKG during stress.    Exam Protocol:The patient presents with no significant symptoms at baseline. The patient exercised 5 min 30 sec to stage II according to the Ian stress echo protocol. Test terminated due to dizziness. 7.0 METS were achieved. The patient achieved a heart rate of 122 bpm which is 87.3% of maximum predicted heart rate. Maximum systolic blood pressure was 190 mmHg which [place] 65[address]. Maximum stress test with 87.3% of age predicted maximum heart rate achieved. The blood pressure response was hypertensive. The patient developed dizziness during the stress exam. Low (less than 1% annual mortality rate) non invasive risk stratification. Exercise stress test Duke Treadmill Score of 3.    Chamber Sizes and Function  Normal left ventricular size, normal global systolic function.    Valves, RV Pressures and Diastolic Function    Tricuspid regurgitation is trace. The tricuspid regurgitant velocity is 2.4 m/s, the estimated right ventricular systolic pressure is 22 mmHg plus right atrial pressure. Trace pulmonic regurgitation is present on color flow.      MEASUREMENTS AND CALCULATIONS    2-D Measurements and LV Function:    LVID (d) 4.6 cm LV FS% (2D) 45 %  LVID (s) 2.5 cm HR          61 bpm  IVS (d)  1.1 cm  LVPW (d) 1.2 cm  Ao Sinus 3.1 cm  Asc Ao   3.8 cm  LA       3.6 cm    Tricuspid Valve and estimated PA pressures:  TR Vmax 2.4 m/s  TR maxG 22 mmHg      Electronically signed by Edil Elizalde MD on 2/18/2020 11:15:40 AM.          Final              OUTSIDE LABS:  CBC:   Lab Results   Component Value Date    WBC 7.7 09/23/2018    WBC 5.2 01/25/2015    HGB 13.9 09/23/2018    HGB 14.5 01/28/2016    HCT 41.3 09/23/2018    HCT 43.1 01/25/2015     09/23/2018     01/25/2015     BMP:   Lab Results   Component Value Date     09/23/2018     01/25/2015    POTASSIUM 3.9 09/23/2018    POTASSIUM 4.6 01/25/2015    CHLORIDE 109 09/23/2018    CHLORIDE 103 01/25/2015    CO2 25 09/23/2018    CO2 28 07/18/2013    BUN 16 09/23/2018    BUN 15 01/25/2015    CR 0.81 09/23/2018    CR 0.71 01/28/2016     (H) 09/23/2018     (A) 01/28/2016     COAGS:   Lab Results   Component Value Date    PTT 29 11/16/2006    INR 0.99 11/16/2006     POC: No results found for: BGM, HCG, HCGS  HEPATIC:   Lab Results   Component Value Date    ALBUMIN 3.9 07/18/2013    PROTTOTAL 6.6 (L) 07/18/2013    ALT 14 09/02/2015    AST 19 07/18/2013    ALKPHOS 68 09/02/2015    BILITOTAL 0.2 09/02/2015     OTHER:   Lab Results   Component Value Date    TRUE 8.2 (L) 09/23/2018    LIPASE 51 10/04/2006    TSH 2.42 01/28/2016       Anesthesia Plan    ASA Status:  2   NPO Status:  NPO Appropriate    Anesthesia Type: MAC.              Consents    Anesthesia Plan(s) and associated risks, benefits, and realistic alternatives discussed. Questions answered and patient/representative(s) expressed understanding.     - Discussed with:  Patient      - Specific Concerns: Specific risks discussed (but not limited to): Possibility of intraoperative awareness..        Postoperative Care    Pain management: IV analgesics, Oral pain medications.   PONV prophylaxis: Ondansetron (or other 5HT-3), Dexamethasone or Solumedrol     Comments:                Armando Franco MD

## 2021-02-16 LAB — COPATH REPORT: NORMAL

## 2021-08-03 ENCOUNTER — APPOINTMENT (OUTPATIENT)
Dept: URBAN - METROPOLITAN AREA CLINIC 256 | Age: 81
Setting detail: DERMATOLOGY
End: 2021-08-03

## 2021-08-03 VITALS — HEIGHT: 67 IN | WEIGHT: 200 LBS | RESPIRATION RATE: 16 BRPM

## 2021-08-03 DIAGNOSIS — L65.9 NONSCARRING HAIR LOSS, UNSPECIFIED: ICD-10-CM

## 2021-08-03 DIAGNOSIS — L21.8 OTHER SEBORRHEIC DERMATITIS: ICD-10-CM

## 2021-08-03 PROCEDURE — 99213 OFFICE O/P EST LOW 20 MIN: CPT

## 2021-08-03 PROCEDURE — OTHER PRESCRIPTION: OTHER

## 2021-08-03 PROCEDURE — OTHER COUNSELING: OTHER

## 2021-08-03 RX ORDER — TRIAMCINOLONE ACETONIDE 1 MG/ML
0.1% LOTION TOPICAL QD PRN
Qty: 1 | Refills: 11 | Status: ERX | COMMUNITY
Start: 2021-08-03

## 2021-08-03 RX ORDER — FINASTERIDE 5 MG/1
5 MG TABLET, FILM COATED ORAL QD
Qty: 90 | Refills: 3 | Status: ERX | COMMUNITY
Start: 2021-08-03

## 2021-08-03 ASSESSMENT — LOCATION DETAILED DESCRIPTION DERM
LOCATION DETAILED: LEFT CENTRAL EYEBROW
LOCATION DETAILED: RIGHT CENTRAL EYEBROW
LOCATION DETAILED: LEFT SUPERIOR PARIETAL SCALP

## 2021-08-03 ASSESSMENT — LOCATION SIMPLE DESCRIPTION DERM
LOCATION SIMPLE: RIGHT EYEBROW
LOCATION SIMPLE: LEFT EYEBROW
LOCATION SIMPLE: SCALP

## 2021-08-03 ASSESSMENT — LOCATION ZONE DERM
LOCATION ZONE: FACE
LOCATION ZONE: SCALP

## 2021-08-30 ENCOUNTER — LAB REQUISITION (OUTPATIENT)
Dept: LAB | Facility: CLINIC | Age: 81
End: 2021-08-30
Payer: COMMERCIAL

## 2021-08-30 DIAGNOSIS — J30.89 OTHER ALLERGIC RHINITIS: ICD-10-CM

## 2021-08-30 PROCEDURE — 87070 CULTURE OTHR SPECIMN AEROBIC: CPT | Mod: ORL | Performed by: OTOLARYNGOLOGY

## 2021-09-02 LAB — BACTERIA SPEC CULT: NORMAL

## 2021-11-03 ENCOUNTER — TRANSCRIBE ORDERS (OUTPATIENT)
Dept: OTHER | Age: 81
End: 2021-11-03

## 2021-11-03 DIAGNOSIS — L65.8 FEMALE PATTERN HAIR LOSS: Primary | ICD-10-CM

## 2022-02-11 NOTE — TELEPHONE ENCOUNTER
FUTURE VISIT INFORMATION      FUTURE VISIT INFORMATION:    Date: 5.6.22    Time: 9:15    [place] [facility] [place]ERR[place] INFORMATION:    Referring provider:  Dr. Jb Oliveira    Referring providers clinic:  [place]    Reason for visit/diagnosis  HL, per pt, recs in Epic    RECORDS REQUESTED FROM:       Clinic name Comments Records Status   [facility] 11.3.21, 1.12.21, 9.3.20  Dr. Roxanne HAWK

## 2022-05-06 ENCOUNTER — LAB (OUTPATIENT)
Dept: LAB | Facility: CLINIC | Age: 82
End: 2022-05-06

## 2022-05-06 ENCOUNTER — PRE VISIT (OUTPATIENT)
Dept: DERMATOLOGY | Facility: CLINIC | Age: 82
End: 2022-05-06

## 2022-05-06 ENCOUNTER — OFFICE VISIT (OUTPATIENT)
Dept: DERMATOLOGY | Facility: CLINIC | Age: 82
End: 2022-05-06
Attending: INTERNAL MEDICINE
Payer: COMMERCIAL

## 2022-05-06 DIAGNOSIS — Z79.899 OTHER LONG TERM (CURRENT) DRUG THERAPY: ICD-10-CM

## 2022-05-06 DIAGNOSIS — Z13.1 DIABETES MELLITUS SCREENING: ICD-10-CM

## 2022-05-06 DIAGNOSIS — L65.9 LOSS OF HAIR: Primary | ICD-10-CM

## 2022-05-06 DIAGNOSIS — E66.89 OTHER OBESITY: ICD-10-CM

## 2022-05-06 DIAGNOSIS — L21.9 DERMATITIS, SEBORRHEIC: ICD-10-CM

## 2022-05-06 DIAGNOSIS — L65.9 LOSS OF HAIR: ICD-10-CM

## 2022-05-06 LAB
BASOPHILS # BLD AUTO: 0 10E3/UL (ref 0–0.2)
BASOPHILS NFR BLD AUTO: 1 %
DEPRECATED CALCIDIOL+CALCIFEROL SERPL-MC: 14 UG/L (ref 20–75)
EOSINOPHIL # BLD AUTO: 0.2 10E3/UL (ref 0–0.7)
EOSINOPHIL NFR BLD AUTO: 3 %
ERYTHROCYTE [DISTWIDTH] IN BLOOD BY AUTOMATED COUNT: 12.8 % (ref 10–15)
FERRITIN SERPL-MCNC: 80 NG/ML (ref 8–252)
HBA1C MFR BLD: 5.9 % (ref 0–5.6)
HCT VFR BLD AUTO: 45.8 % (ref 35–47)
HGB BLD-MCNC: 14.8 G/DL (ref 11.7–15.7)
IMM GRANULOCYTES # BLD: 0 10E3/UL
IMM GRANULOCYTES NFR BLD: 0 %
IRON SATN MFR SERPL: 33 % (ref 15–46)
IRON SERPL-MCNC: 97 UG/DL (ref 35–180)
LYMPHOCYTES # BLD AUTO: 1.1 10E3/UL (ref 0.8–5.3)
LYMPHOCYTES NFR BLD AUTO: 20 %
MCH RBC QN AUTO: 31.9 PG (ref 26.5–33)
MCHC RBC AUTO-ENTMCNC: 32.3 G/DL (ref 31.5–36.5)
MCV RBC AUTO: 99 FL (ref 78–100)
MONOCYTES # BLD AUTO: 0.9 10E3/UL (ref 0–1.3)
MONOCYTES NFR BLD AUTO: 15 %
NEUTROPHILS # BLD AUTO: 3.5 10E3/UL (ref 1.6–8.3)
NEUTROPHILS NFR BLD AUTO: 61 %
NRBC # BLD AUTO: 0 10E3/UL
NRBC BLD AUTO-RTO: 0 /100
PLATELET # BLD AUTO: 235 10E3/UL (ref 150–450)
RBC # BLD AUTO: 4.64 10E6/UL (ref 3.8–5.2)
TIBC SERPL-MCNC: 294 UG/DL (ref 240–430)
TSH SERPL DL<=0.005 MIU/L-ACNC: 3.15 MU/L (ref 0.4–4)
WBC # BLD AUTO: 5.8 10E3/UL (ref 4–11)

## 2022-05-06 PROCEDURE — 83550 IRON BINDING TEST: CPT | Performed by: PATHOLOGY

## 2022-05-06 PROCEDURE — 99000 SPECIMEN HANDLING OFFICE-LAB: CPT | Performed by: PATHOLOGY

## 2022-05-06 PROCEDURE — 83036 HEMOGLOBIN GLYCOSYLATED A1C: CPT | Performed by: PATHOLOGY

## 2022-05-06 PROCEDURE — 85025 COMPLETE CBC W/AUTO DIFF WBC: CPT | Performed by: PATHOLOGY

## 2022-05-06 PROCEDURE — 82306 VITAMIN D 25 HYDROXY: CPT | Mod: 90 | Performed by: PATHOLOGY

## 2022-05-06 PROCEDURE — 82728 ASSAY OF FERRITIN: CPT | Performed by: PATHOLOGY

## 2022-05-06 PROCEDURE — 36415 COLL VENOUS BLD VENIPUNCTURE: CPT | Performed by: PATHOLOGY

## 2022-05-06 PROCEDURE — 84443 ASSAY THYROID STIM HORMONE: CPT | Performed by: PATHOLOGY

## 2022-05-06 PROCEDURE — 99203 OFFICE O/P NEW LOW 30 MIN: CPT | Performed by: DERMATOLOGY

## 2022-05-06 RX ORDER — AZELASTINE 1 MG/ML
SPRAY, METERED NASAL
COMMUNITY
Start: 2022-02-21

## 2022-05-06 RX ORDER — KETOCONAZOLE 20 MG/ML
SHAMPOO TOPICAL
Qty: 120 ML | Refills: 3 | Status: SHIPPED | OUTPATIENT
Start: 2022-05-06

## 2022-05-06 RX ORDER — LOSARTAN POTASSIUM 25 MG/1
TABLET ORAL
COMMUNITY
Start: 2022-03-16

## 2022-05-06 RX ORDER — MOMETASONE FUROATE 1 MG/ML
SOLUTION TOPICAL
Qty: 60 ML | Refills: 4 | Status: SHIPPED | OUTPATIENT
Start: 2022-05-06

## 2022-05-06 ASSESSMENT — PAIN SCALES - GENERAL: PAINLEVEL: NO PAIN (0)

## 2022-05-06 NOTE — PATIENT INSTRUCTIONS
-Wash hair twice weekly with ketoconazole shampoo  -Other days use Free and Clear products by [employer]icream  -Mometasone solution nightly for 2 weeks then twice weekly ongoing  -Continue finasteride  -Labs today.

## 2022-05-06 NOTE — LETTER
5/6/2022       RE: Elvis Cruz  [address]  [place] [address]     Dear Colleague,    Thank you for referring your patient, Elvis Cruz, to the [facility]MAT[facility] [place] [facility]. Please see a copy of my visit note below.    DERMATOLOGY CLINIC VISIT NOTE    CHIEF COMPLAINT:  Hair loss.    DERMATOLOGY PROBLEM LIST:    1. Hair loss, nonscarring, with underlying inflammatory dermatosis.    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  Differential diagnoses would include seborrheic dermatitis, psoriasis or allergic contact dermatitis.  The patient has been using a variety of topicals botanical products of the scalp which certainly could induce either irritant versus allergic contact dermatitis.  It is unclear if the hair loss is a primary process or secondary to underlying scalp inflammation.  Our goals will be to address scalp inflammation and determine if hair shedding improves.    The patient has not had evaluations for other metabolic or nutritional causes of hair loss.  These will be collected today.  Vitamin D, TSH with free T4, CBC, iron, ferritin level and hemoglobin A1c collected.  I will contact patient with results.  In review of systemic medications, I am not noting medicines that would commonly be implicated in hair loss except for Topamax but this has been a chronic medication.    -Transition to all Free & Clear hair care products.  -Ketoconazole shampoo to be applied twice weekly.  -Mometasone solution nightly for 2 weeks, then twice weekly ongoing.  -Continue finasteride dose unknown prescribed by outside dermatologist    I will plan to recheck Ms. Cruz in 3 months' time but she may contact me in the interim if concern.    Thank you for this consultation.     Sophia  MD Johnathon   of Dermatology  [facility]    Copy: Jb Oliveira MD  [address] [address]      _______________________________________  _______________________________________    HISTORY OF PRESENT ILLNESS:  Ms. Cruz is an 81-year-old female presenting to Dermatology Clinic for hair loss seen at the request of Dr. Oliveira.  She states that this has been ongoing for 1 year.  She denies any health events that started several months prior to the hair loss.  She notes no new medications during this time period either.  She describes intermittent itching and scaling of the scalp.  She has tried a variety of over-the-counter shampoos and other hair care products.  She uses Aveda styling products.  She has not used topical medications such as Rogaine.  She was seen by an outside dermatologist who started her on finasteride.  She is unsure of the dose but remains on this medication.  She has not seen significant improvement since starting this medicine.  She describes that her hair loss has been diffuse and that she has noted easy shedding of the hair of the scalp.  No loss of hair elsewhere on the body.  No other rashes elsewhere on the body.    Patient Active Problem List   Diagnosis     Hepatitis, autoimmune (H)     Hyperlipidemia, mixed     GERD (gastroesophageal reflux disease)     DJD (degenerative joint disease), lumbar       Allergies   Allergen Reactions     Adhesive Tape Unknown and Other (See Comments)     Skin breakdown        Celecoxib Nausea and Vomiting     No Known Drug Allergies          Current Outpatient Medications   Medication     atorvastatin (LIPITOR) 10 MG tablet     cycloSPORINE (RESTASIS) 0.05 % ophthalmic emulsion     diphenhydrAMINE-acetaminophen (TYLENOL PM)  MG tablet     furosemide (LASIX) 20 MG tablet     hydrocortisone 2.5 % cream     ibuprofen (ADVIL/MOTRIN) 200 MG tablet     ketoconazole  (NIZORAL) 2 % external shampoo     LORazepam (ATIVAN) 0.5 MG tablet     losartan (COZAAR) 25 MG tablet     metroNIDAZOLE (METROGEL) 1 % gel     minocycline (MINOCIN,DYNACIN) 100 MG capsule     minocycline (MINOCIN/DYNACIN) 100 MG capsule     mometasone (ELOCON) 0.1 % external solution     multivitamin w/minerals (THERA-VIT-M) tablet     nystatin (MYCOSTATIN) 760453 UNIT/GM POWD     pantoprazole (PROTONIX) 40 MG EC tablet     PANTOPRAZOLE SODIUM PO     pramipexole (MIRAPEX) 0.5 MG tablet     ROPINIROLE HCL PO     temazepam (RESTORIL) 15 MG capsule     topiramate (TOPAMAX) 25 MG tablet     traMADol (ULTRAM) 50 MG tablet     traZODone (DESYREL) 100 MG tablet     valACYclovir (VALTREX) 1000 mg tablet     azelastine (ASTELIN) 0.1 % nasal spray     No current facility-administered medications for this visit.           SOCIAL HISTORY:  The patient is  and lives in [place].    REVIEW OF SYSTEMS:  The patient is feeling well today without other skin concerns.    PHYSICAL EXAMINATION:    GENERAL:  The patient is a healthy-appearing 81-year-old female in no distress.  SKIN:  Exam was focused on the scalp and face.  a.  Examination of the scalp shows diffuse erythema with scaling throughout the occipital, temporal and vertex scalp.  Hair pull test is positive on the bilateral temporal scalp, but no discrete alopecic patches.  There is decreased density over the vertex scalp and bitemporal scalp. Normal eyelashes and eyebrows.

## 2022-05-06 NOTE — PROGRESS NOTES
DERMATOLOGY CLINIC VISIT NOTE    CHIEF COMPLAINT:  Hair loss.    DERMATOLOGY PROBLEM LIST:    1. Hair loss, nonscarring, with underlying inflammatory dermatosis.    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  Differential diagnoses would include seborrheic dermatitis, psoriasis or allergic contact dermatitis.  The patient has been using a variety of topicals botanical products of the scalp which certainly could induce either irritant versus allergic contact dermatitis.  It is unclear if the hair loss is a primary process or secondary to underlying scalp inflammation.  Our goals will be to address scalp inflammation and determine if hair shedding improves.    The patient has not had evaluations for other metabolic or nutritional causes of hair loss.  These will be collected today.  Vitamin D, TSH with free T4, CBC, iron, ferritin level and hemoglobin A1c collected.  I will contact patient with results.  In review of systemic medications, I am not noting medicines that would commonly be implicated in hair loss except for Topamax but this has been a chronic medication.    -Transition to all Free & Clear hair care products.  -Ketoconazole shampoo to be applied twice weekly.  -Mometasone solution nightly for 2 weeks, then twice weekly ongoing.  -Continue finasteride dose unknown prescribed by outside dermatologist    I will plan to recheck Ms. Cruz in 3 months' time but she may contact me in the interim if concern.    Thank you for this consultation.     Sophia Diego MD   of Dermatology  [facility]    Copy: Jb Oliveira MD  [address] [address]      _______________________________________  _______________________________________    HISTORY OF PRESENT ILLNESS:  Ms. Cruz is an 81-year-old female  presenting to Dermatology Clinic for hair loss seen at the request of Dr. Oliveira.  She states that this has been ongoing for 1 year.  She denies any health events that started several months prior to the hair loss.  She notes no new medications during this time period either.  She describes intermittent itching and scaling of the scalp.  She has tried a variety of over-the-counter shampoos and other hair care products.  She uses Aveda styling products.  She has not used topical medications such as Rogaine.  She was seen by an outside dermatologist who started her on finasteride.  She is unsure of the dose but remains on this medication.  She has not seen significant improvement since starting this medicine.  She describes that her hair loss has been diffuse and that she has noted easy shedding of the hair of the scalp.  No loss of hair elsewhere on the body.  No other rashes elsewhere on the body.    Patient Active Problem List   Diagnosis     Hepatitis, autoimmune (H)     Hyperlipidemia, mixed     GERD (gastroesophageal reflux disease)     DJD (degenerative joint disease), lumbar       Allergies   Allergen Reactions     Adhesive Tape Unknown and Other (See Comments)     Skin breakdown        Celecoxib Nausea and Vomiting     No Known Drug Allergies          Current Outpatient Medications   Medication     atorvastatin (LIPITOR) 10 MG tablet     cycloSPORINE (RESTASIS) 0.05 % ophthalmic emulsion     diphenhydrAMINE-acetaminophen (TYLENOL PM)  MG tablet     furosemide (LASIX) 20 MG tablet     hydrocortisone 2.5 % cream     ibuprofen (ADVIL/MOTRIN) 200 MG tablet     ketoconazole (NIZORAL) 2 % external shampoo     LORazepam (ATIVAN) 0.5 MG tablet     losartan (COZAAR) 25 MG tablet     metroNIDAZOLE (METROGEL) 1 % gel     minocycline (MINOCIN,DYNACIN) 100 MG capsule     minocycline (MINOCIN/DYNACIN) 100 MG capsule     mometasone (ELOCON) 0.1 % external solution     multivitamin w/minerals (THERA-VIT-M) tablet      nystatin (MYCOSTATIN) 481743 UNIT/GM POWD     pantoprazole (PROTONIX) 40 MG EC tablet     PANTOPRAZOLE SODIUM PO     pramipexole (MIRAPEX) 0.5 MG tablet     ROPINIROLE HCL PO     temazepam (RESTORIL) 15 MG capsule     topiramate (TOPAMAX) 25 MG tablet     traMADol (ULTRAM) 50 MG tablet     traZODone (DESYREL) 100 MG tablet     valACYclovir (VALTREX) 1000 mg tablet     azelastine (ASTELIN) 0.1 % nasal spray     No current facility-administered medications for this visit.           SOCIAL HISTORY:  The patient is  and lives in [place].    REVIEW OF SYSTEMS:  The patient is feeling well today without other skin concerns.    PHYSICAL EXAMINATION:    GENERAL:  The patient is a healthy-appearing 81-year-old female in no distress.  SKIN:  Exam was focused on the scalp and face.  a.  Examination of the scalp shows diffuse erythema with scaling throughout the occipital, temporal and vertex scalp.  Hair pull test is positive on the bilateral temporal scalp, but no discrete alopecic patches.  There is decreased density over the vertex scalp and bitemporal scalp. Normal eyelashes and eyebrows.

## 2022-05-06 NOTE — NURSING NOTE
Dermatology Rooming Note    Elvis Cruz's goals for this visit include:   Chief Complaint   Patient presents with     Hair Loss     Elvis is here today for hair loss, reports general thinning occurring for about 1 year     Maame Lin, EMT

## 2022-06-11 ENCOUNTER — HEALTH MAINTENANCE LETTER (OUTPATIENT)
Age: 82
End: 2022-06-11

## 2022-10-22 ENCOUNTER — HEALTH MAINTENANCE LETTER (OUTPATIENT)
Age: 82
End: 2022-10-22

## 2023-05-25 ENCOUNTER — TRANSCRIBE ORDERS (OUTPATIENT)
Dept: OTHER | Age: 83
End: 2023-05-25

## 2023-05-25 DIAGNOSIS — R05.3 CHRONIC COUGH: Primary | ICD-10-CM

## 2023-05-26 DIAGNOSIS — R05.3 CHRONIC COUGH: Primary | ICD-10-CM

## 2023-05-26 NOTE — TELEPHONE ENCOUNTER
RECORDS RECEIVED FROM: internal /ce   DATE RECEIVED: 7.26.23    NOTES STATUS DETAILS   OFFICE NOTE from referring provider internal  Effie Christiansen APRN CNP   OFFICE NOTE from other specialist ce yamil-   2.23.23 Edvin    DISCHARGE SUMMARY from hospital     DISCHARGE REPORT from the ER     MEDICATION LIST internal     IMAGING  (NEED IMAGES AND REPORTS)     CT SCAN     CHEST XRAY (CXR) internal  2.21.22   TESTS     PULMONARY FUNCTION TESTING (PFT) internal  Scheduled 7.26.23        Action 5.26.23 sv    Action Taken Message sent to CC pool to help schedule CXR order

## 2023-05-30 ENCOUNTER — TELEPHONE (OUTPATIENT)
Dept: PULMONOLOGY | Facility: CLINIC | Age: 83
End: 2023-05-30
Payer: COMMERCIAL

## 2023-05-30 NOTE — TELEPHONE ENCOUNTER
Patient Contacted for the patient to call back and schedule the following:    Appointment type: New   Provider: Caitlin   Return date: 07/19/2023  Specialty phone number: [phone]  Additional appointment(s) needed: CXR  Additonal Notes: Scheduled CXR for 07/19/2023

## 2023-07-19 ENCOUNTER — ANCILLARY PROCEDURE (OUTPATIENT)
Dept: GENERAL RADIOLOGY | Facility: CLINIC | Age: 83
End: 2023-07-19
Payer: COMMERCIAL

## 2023-07-19 DIAGNOSIS — R05.3 CHRONIC COUGH: ICD-10-CM

## 2023-07-19 PROCEDURE — 71046 X-RAY EXAM CHEST 2 VIEWS: CPT | Mod: GC | Performed by: RADIOLOGY

## 2023-07-26 ENCOUNTER — PRE VISIT (OUTPATIENT)
Dept: PULMONOLOGY | Facility: CLINIC | Age: 83
End: 2023-07-26

## 2023-07-26 ENCOUNTER — OFFICE VISIT (OUTPATIENT)
Dept: PULMONOLOGY | Facility: CLINIC | Age: 83
End: 2023-07-26
Attending: INTERNAL MEDICINE
Payer: COMMERCIAL

## 2023-07-26 ENCOUNTER — TELEPHONE (OUTPATIENT)
Dept: PULMONOLOGY | Facility: CLINIC | Age: 83
End: 2023-07-26

## 2023-07-26 VITALS
OXYGEN SATURATION: 99 % | SYSTOLIC BLOOD PRESSURE: 171 MMHG | RESPIRATION RATE: 17 BRPM | DIASTOLIC BLOOD PRESSURE: 80 MMHG | BODY MASS INDEX: 36.98 KG/M2 | HEART RATE: 67 BPM | HEIGHT: 67 IN | WEIGHT: 235.6 LBS

## 2023-07-26 DIAGNOSIS — R05.3 CHRONIC COUGH: ICD-10-CM

## 2023-07-26 PROCEDURE — G0463 HOSPITAL OUTPT CLINIC VISIT: HCPCS | Performed by: INTERNAL MEDICINE

## 2023-07-26 PROCEDURE — 94729 DIFFUSING CAPACITY: CPT | Performed by: INTERNAL MEDICINE

## 2023-07-26 PROCEDURE — 94375 RESPIRATORY FLOW VOLUME LOOP: CPT | Performed by: INTERNAL MEDICINE

## 2023-07-26 PROCEDURE — 94726 PLETHYSMOGRAPHY LUNG VOLUMES: CPT | Performed by: INTERNAL MEDICINE

## 2023-07-26 PROCEDURE — 99204 OFFICE O/P NEW MOD 45 MIN: CPT | Mod: 25 | Performed by: INTERNAL MEDICINE

## 2023-07-26 RX ORDER — TORSEMIDE 20 MG/1
40 TABLET ORAL
COMMUNITY
Start: 2023-06-23

## 2023-07-26 RX ORDER — FINASTERIDE 5 MG/1
1 TABLET, FILM COATED ORAL EVERY MORNING
COMMUNITY
Start: 2023-03-18

## 2023-07-26 RX ORDER — HYDROXYZINE HYDROCHLORIDE 10 MG/5ML
1 SYRUP ORAL AT BEDTIME
COMMUNITY
Start: 2022-06-24

## 2023-07-26 RX ORDER — FAMOTIDINE 40 MG/1
TABLET, FILM COATED ORAL
COMMUNITY
Start: 2022-07-01

## 2023-07-26 RX ORDER — OMEPRAZOLE 40 MG/1
CAPSULE, DELAYED RELEASE ORAL
COMMUNITY
Start: 2023-01-17

## 2023-07-26 RX ORDER — FLUTICASONE PROPIONATE AND SALMETEROL XINAFOATE 115; 21 UG/1; UG/1
AEROSOL, METERED RESPIRATORY (INHALATION)
COMMUNITY
Start: 2023-05-04 | End: 2023-11-08

## 2023-07-26 RX ORDER — SUMATRIPTAN 100 MG/1
100 TABLET, FILM COATED ORAL
COMMUNITY
Start: 2022-12-22

## 2023-07-26 RX ORDER — HYDROCHLOROTHIAZIDE 12.5 MG/1
1 CAPSULE ORAL EVERY MORNING
COMMUNITY
Start: 2023-04-17

## 2023-07-26 RX ORDER — IPRATROPIUM BROMIDE 42 UG/1
SPRAY, METERED NASAL
COMMUNITY

## 2023-07-26 RX ORDER — FLUTICASONE PROPIONATE 50 MCG
1 SPRAY, SUSPENSION (ML) NASAL
COMMUNITY
Start: 2023-01-13 | End: 2023-11-08

## 2023-07-26 RX ORDER — HYDROCODONE BITARTRATE AND ACETAMINOPHEN 5; 325 MG/1; MG/1
TABLET ORAL
COMMUNITY

## 2023-07-26 RX ORDER — FLUOCINOLONE ACETONIDE 0.11 MG/ML
OIL TOPICAL
COMMUNITY
Start: 2023-06-20

## 2023-07-26 RX ORDER — MELOXICAM 7.5 MG/1
TABLET ORAL
COMMUNITY

## 2023-07-26 RX ORDER — LISINOPRIL 10 MG/1
1 TABLET ORAL DAILY
COMMUNITY

## 2023-07-26 RX ORDER — OXYCODONE AND ACETAMINOPHEN 5; 325 MG/1; MG/1
TABLET ORAL
COMMUNITY
Start: 2023-07-12

## 2023-07-26 ASSESSMENT — ENCOUNTER SYMPTOMS
SHORTNESS OF BREATH: 0
COUGH: 1
HEMOPTYSIS: 0
WHEEZING: 0
SPUTUM PRODUCTION: 1

## 2023-07-26 ASSESSMENT — PAIN SCALES - GENERAL: PAINLEVEL: SEVERE PAIN (6)

## 2023-07-26 NOTE — NURSING NOTE
Chief Complaint   Patient presents with    Consult     New Chronic cough    Medications reviewed and vital signs taken.   Williams Gonzalez CMA

## 2023-07-26 NOTE — LETTER
7/26/2023         RE: Elvis Cruz  [address]  [facility] [address]        Dear Colleague,    Thank you for referring your patient, Elvis Cruz, to the [facility] FOR LUNG SCIENCE AND [facility]. Please see a copy of my visit note below.              Pulmonary Clinic  New Patient Evaluation    Name: Elvis Cruz MRN: [id number]     Age: 82 year old   YOB: 1940             HPI:   CC: Chronic cough    Elvis Cruz is a 82 year old female with H/O anxiety, GERD, PE on Eliquis, lower extremity edema, hard of hearing, LUBNA intolerant of CPAP, and chronic cough who presents to discuss her cough.    She reports that she has had a cough for at least 4 to 5 years, but feels that it has worsened over that time.  The cough is productive and the amount of mucus production has increased.  She has been evaluated by multiple specialties and multiple systems including [facility], [place], and [facility].    She has been tried on multiple inhalers including Advair without benefit.  She reports a history of environmental allergies and was on allergy shots from childhood through college, though she denies allergy symptoms other than the cough currently.  She has been tried on multiple allergy medications without improvement in the cough and is currently taking Allegra-D.  She has also tried multiple nasal sprays including Atrovent, Astelin, and Flonase.    She reports that the cough is particularly worse after eating or when lying down, as well as first thing in the morning.  She has had multiple swallow evaluations that have been within normal limits.  She underwent rhinolaryngoscopy without abnormal findings.    She has a history of GERD and is currently taking omeprazole 40 mg twice daily.  She thinks this helps somewhat with the cough, but has not completely eliminated it.    She was previously on lisinopril but this was discontinued a few years  ago during her cough evaluation at [place].    She has severe bilateral lower extremity edema.  She was seen by cardiology last month and changed from furosemide 40 mg/day to torsemide 40 mg/day, however she reports that she is only taking 20 mg/day.            Exposure History:   Tobacco: 6-7 pkyears, quit over 50 yrs ago  Other inhaled substances (Vaping, hookah. Marijuana): None   GERD: Denies, has omeprazole ordered but not taking it               Past Medical History:     Past Medical History:   Diagnosis Date     Anxiety      Arthritis      DJD (degenerative joint disease), lumbar     foraminal stenosis     GERD (gastroesophageal reflux disease)      Hepatitis, autoimmune (H)      Hyperlipidemia, mixed      Pulmonary embolism (H)      Restless leg              Past Surgical History:      Past Surgical History:   Procedure Laterality Date     BACK SURGERY       CARDIAC SURGERY      pt has LINQ recorder, monitoring for arrthymias     GYN SURGERY      hysterectomy     OTHER SURGICAL HISTORY      sinus     OTHER SURGICAL HISTORY      back     OTHER SURGICAL HISTORY      cataract removal - bilateral     TONSILLECTOMY               Social History:     Social History     Socioeconomic History     Marital status:      Spouse name: Not on file     Number of children: Not on file     Years of education: Not on file     Highest education level: Not on file   Occupational History     Not on file   Tobacco Use     Smoking status: Former     Types: Cigarettes     Quit date: 1976     Years since quittin.3     Smokeless tobacco: Never   Substance and Sexual Activity     Alcohol use: No     Alcohol/week: 0.0 standard drinks of alcohol     Drug use: No     Sexual activity: Not on file   Other Topics Concern     Parent/sibling w/ CABG, MI or angioplasty before 65F 55M? Not Asked   Social History Narrative     Not on file     Social Determinants of Health     Financial Resource Strain: Not on file   Food Insecurity:  Not on file   Transportation Needs: Not on file   Physical Activity: Not on file   Stress: Not on file   Social Connections: Not on file   Intimate Partner Violence: Not on file   Housing Stability: Not on file            Family History:   History reviewed. No pertinent family history.          Immunizations:     Immunization History   Administered Date(s) Administered     COVID-19 Bivalent 12+ (Pfizer) 10/08/2022     COVID-19 Monovalent 12+ (Pfizer 2022) 04/11/2022     HEPA 08/10/2000, 08/27/2003     Poliovirus, inactivated (IPV) 08/10/2000     TD,PF 7+ (Tenivac) 08/21/2000     Typhoid IM 08/27/2003             Allergies:     Allergies   Allergen Reactions     Adhesive Tape Unknown and Other (See Comments)     Skin breakdown        Celecoxib Nausea and Vomiting     No Known Drug Allergy              Medications:   atorvastatin (LIPITOR) 10 MG tablet, Take 10 mg by mouth daily  azelastine (ASTELIN) 0.1 % nasal spray, ADMINISTER 2 SPRAYS INTO EACH NOSTRIL 2 TIMES A DAY AS DIRECTED  cycloSPORINE (RESTASIS) 0.05 % ophthalmic emulsion, 2 (two) times a day. Both eyes  diphenhydrAMINE-acetaminophen (TYLENOL PM)  MG tablet, Take 1 tablet by mouth  furosemide (LASIX) 20 MG tablet, Take 20 mg by mouth daily  hydrocortisone 2.5 % cream,   ibuprofen (ADVIL/MOTRIN) 200 MG tablet,   ketoconazole (NIZORAL) 2 % external shampoo, Use to shampoo twice weekly. Leave on 5 min then rinse. 120 ml =1 month.  LORazepam (ATIVAN) 0.5 MG tablet,   losartan (COZAAR) 25 MG tablet, TAKE 1 TABLET (25 MG TOTAL) BY MOUTH DAILY. INSTEAD OF LISINOPRIL  metroNIDAZOLE (METROGEL) 1 % gel,   minocycline (MINOCIN,DYNACIN) 100 MG capsule, Take  by mouth 2 times daily.  minocycline (MINOCIN/DYNACIN) 100 MG capsule, Take 100 mg by mouth  mometasone (ELOCON) 0.1 % external solution, Apply to scalp nightly for 2 weeks, then twice weekly. 60 ml=1 month.  multivitamin w/minerals (THERA-VIT-M) tablet, Take 1 tablet by mouth daily.  nystatin (MYCOSTATIN)  "818052 UNIT/GM POWD,   pantoprazole (PROTONIX) 40 MG EC tablet, Take 40 mg by mouth  PANTOPRAZOLE SODIUM PO, Take  by mouth.  pramipexole (MIRAPEX) 0.5 MG tablet,   ROPINIROLE HCL PO, Take  by mouth.  temazepam (RESTORIL) 15 MG capsule, Take 15 mg by mouth nightly as needed for sleep  topiramate (TOPAMAX) 25 MG tablet, Take up to 100 MG daily.  traMADol (ULTRAM) 50 MG tablet,   traZODone (DESYREL) 100 MG tablet, Take 200 mg by mouth  valACYclovir (VALTREX) 1000 mg tablet, Take 1 g by mouth    No current facility-administered medications on file prior to visit.           Review of Systems:     Review of Systems   Respiratory:  Positive for cough and sputum production. Negative for hemoptysis, shortness of breath and wheezing.               Exam:   BP (!) 171/80   Pulse 67   Resp 17   Ht 1.692 m (5' 6.6\")   SpO2 99%   BMI 31.70 kg/m        Physical Exam  Vitals and nursing note reviewed.   Constitutional:       Appearance: She is obese.      Comments: Frequent throat clearing, frequent coughing   Cardiovascular:      Rate and Rhythm: Normal rate and regular rhythm.      Heart sounds: No murmur heard.  Pulmonary:      Effort: Pulmonary effort is normal.      Breath sounds: Normal breath sounds. No wheezing, rhonchi or rales.   Musculoskeletal:      Comments: Tight pitting edema in bilateral lower extremities   Neurological:      Mental Status: She is alert.       Fingernails without clubbing         Data:     PFT: 7/26/2023    The FVC, FEV1, and FEV1/FVC ratio within normal limits.  Lung volumes are within normal limits.  The diffusion capacity is in the normal limits, however the diffusion capacity is not corrected for the patient's hemoglobin.    IMPRESSION:  Normal spirometry      CXR 7/19/2023  No acute cardiopulmonary findings.     Video swallow 9/2019  Limited esophagram demonstrates normal esophageal emptying.     Video swallow at [place] 2015  1. Moderate prominence of the cricopharyngeus is probably " unrelated to the patient's cough. Please see speech pathology note for additional information. 2. No gastroesophageal reflux witnessed.     2/21/22 normal rhinolaryngoscopy [place]    All studies listed here were independently reviewed and interpreted by me today.          Assessment and Plan:     ## Chronic cough  ## Lower extremity edema  ## Possible silent reflux  She had a chronic cough for the last 4 to 5 years which has been extensively evaluated through the [facility] and [place] systems including rhinolaryngoscopy and swallow evaluation.  She reports that her cough is worse after eating, worse when supine, and worse when first getting up in the morning.  The cough is generally productive.  As far as I can tell she has not had sputum cultures performed.  Given that her symptoms primarily occur after eating and at night I am concerned about silent reflux.  She is currently taking omeprazole 40 mg twice daily.  In addition to silent reflux, pulmonary edema is a concern given her tight lower extremity edema.  This is being managed by her cardiologist, however it does not sound like the patient is taking her diuretics as ordered.  Per the last note from her [facility] cardiologist it looks like she is supposed be taking torsemide 40 mg daily, however she reports only taking 20 mg daily.  -Sputum culture  -I requested that she discuss her edema with her cardiologist  -If the sputum culture is negative and cough does not resolve after treatment of her edema, she may need a pH probe to assess for silent aspiration.  -If these measures are unrevealing, we may consider bronchoscopy to rule out chronic infection  -She also reports that the cough seems to be triggered from her upper throat so speech therapy will also be pursued if the above measures are unrevealing/ineffective.        ## Health maintenance  COVID x4, most recently in 2022, annual influenza this year, Prevnar in 2016, Pneumovax x3 most recently in 2009, Tdap in  2013      Return to clinic: 3 months      I personally spent 57 minutes on the date of the encounter doing chart review, history and exam, documentation and further activities per the note.    Eric Tucker M.D.  Pulmonary & Critical Care  Pager: Click Here to page      The above note was dictated using voice recognition software and may include typographical errors. Please contact the author for any clarifications.      Again, thank you for allowing me to participate in the care of your patient.        Sincerely,        Eric Tucker MD

## 2023-07-26 NOTE — TELEPHONE ENCOUNTER
Received direction by Dr. Tucker to obtain records from [place], [facility] and [facility].     Fax cover sheet and demographic sheet with requests for all records within the last 5 years be faxed to ATTN: Dr. Tucker/Gamaliel faxed to [facility] [phone]    Fax cover sheet and demographic sheet with request for all chest imaging be pushed to [place] PACS faxed to [facility] [phone]    Spoke with HIM at [facility] [phone] to request all chest imaging to be pushed to [place] PACS. Should anticipate images either today or tomorrow.

## 2023-07-26 NOTE — PROGRESS NOTES
Pulmonary Clinic  New Patient Evaluation    Name: Elvis Cruz MRN: [id number]     Age: 82 year old   YOB: 1940             HPI:   CC: Chronic cough    Elvis Cruz is a 82 year old female with H/O anxiety, GERD, PE on Eliquis, lower extremity edema, hard of hearing, LUBNA intolerant of CPAP, and chronic cough who presents to discuss her cough.    She reports that she has had a cough for at least 4 to 5 years, but feels that it has worsened over that time.  The cough is productive and the amount of mucus production has increased.  She has been evaluated by multiple specialties and multiple systems including [facility], [place], and [facility].    She has been tried on multiple inhalers including Advair without benefit.  She reports a history of environmental allergies and was on allergy shots from childhood through college, though she denies allergy symptoms other than the cough currently.  She has been tried on multiple allergy medications without improvement in the cough and is currently taking Allegra-D.  She has also tried multiple nasal sprays including Atrovent, Astelin, and Flonase.    She reports that the cough is particularly worse after eating or when lying down, as well as first thing in the morning.  She has had multiple swallow evaluations that have been within normal limits.  She underwent rhinolaryngoscopy without abnormal findings.    She has a history of GERD and is currently taking omeprazole 40 mg twice daily.  She thinks this helps somewhat with the cough, but has not completely eliminated it.    She was previously on lisinopril but this was discontinued a few years ago during her cough evaluation at [place].    She has severe bilateral lower extremity edema.  She was seen by cardiology last month and changed from furosemide 40 mg/day to torsemide 40 mg/day, however she reports that she is only taking 20 mg/day.            Exposure History:   Tobacco: 6-7 pkyears, quit  over 50 yrs ago  Other inhaled substances (Vaping, hookah. Marijuana): None   GERD: Denies, has omeprazole ordered but not taking it               Past Medical History:     Past Medical History:   Diagnosis Date    Anxiety     Arthritis     DJD (degenerative joint disease), lumbar     foraminal stenosis    GERD (gastroesophageal reflux disease)     Hepatitis, autoimmune (H)     Hyperlipidemia, mixed     Pulmonary embolism (H)     Restless leg              Past Surgical History:      Past Surgical History:   Procedure Laterality Date    BACK SURGERY      CARDIAC SURGERY      pt has LINQ recorder, monitoring for arrthymias    GYN SURGERY      hysterectomy    OTHER SURGICAL HISTORY      sinus    OTHER SURGICAL HISTORY      back    OTHER SURGICAL HISTORY      cataract removal - bilateral    TONSILLECTOMY               Social History:     Social History     Socioeconomic History    Marital status:      Spouse name: Not on file    Number of children: Not on file    Years of education: Not on file    Highest education level: Not on file   Occupational History    Not on file   Tobacco Use    Smoking status: Former     Types: Cigarettes     Quit date: 1976     Years since quittin.3    Smokeless tobacco: Never   Substance and Sexual Activity    Alcohol use: No     Alcohol/week: 0.0 standard drinks of alcohol    Drug use: No    Sexual activity: Not on file   Other Topics Concern    Parent/sibling w/ CABG, MI or angioplasty before 65F 55M? Not Asked   Social History Narrative    Not on file     Social Determinants of Health     Financial Resource Strain: Not on file   Food Insecurity: Not on file   Transportation Needs: Not on file   Physical Activity: Not on file   Stress: Not on file   Social Connections: Not on file   Intimate Partner Violence: Not on file   Housing Stability: Not on file            Family History:   History reviewed. No pertinent family history.          Immunizations:     Immunization  History   Administered Date(s) Administered    COVID-19 Bivalent 12+ (Pfizer) 10/08/2022    COVID-19 Monovalent 12+ (Pfizer 2022) 04/11/2022    HEPA 08/10/2000, 08/27/2003    Poliovirus, inactivated (IPV) 08/10/2000    TD,PF 7+ (Tenivac) 08/21/2000    Typhoid IM 08/27/2003             Allergies:     Allergies   Allergen Reactions    Adhesive Tape Unknown and Other (See Comments)     Skin breakdown       Celecoxib Nausea and Vomiting    No Known Drug Allergy              Medications:   atorvastatin (LIPITOR) 10 MG tablet, Take 10 mg by mouth daily  azelastine (ASTELIN) 0.1 % nasal spray, ADMINISTER 2 SPRAYS INTO EACH NOSTRIL 2 TIMES A DAY AS DIRECTED  cycloSPORINE (RESTASIS) 0.05 % ophthalmic emulsion, 2 (two) times a day. Both eyes  diphenhydrAMINE-acetaminophen (TYLENOL PM)  MG tablet, Take 1 tablet by mouth  furosemide (LASIX) 20 MG tablet, Take 20 mg by mouth daily  hydrocortisone 2.5 % cream,   ibuprofen (ADVIL/MOTRIN) 200 MG tablet,   ketoconazole (NIZORAL) 2 % external shampoo, Use to shampoo twice weekly. Leave on 5 min then rinse. 120 ml =1 month.  LORazepam (ATIVAN) 0.5 MG tablet,   losartan (COZAAR) 25 MG tablet, TAKE 1 TABLET (25 MG TOTAL) BY MOUTH DAILY. INSTEAD OF LISINOPRIL  metroNIDAZOLE (METROGEL) 1 % gel,   minocycline (MINOCIN,DYNACIN) 100 MG capsule, Take  by mouth 2 times daily.  minocycline (MINOCIN/DYNACIN) 100 MG capsule, Take 100 mg by mouth  mometasone (ELOCON) 0.1 % external solution, Apply to scalp nightly for 2 weeks, then twice weekly. 60 ml=1 month.  multivitamin w/minerals (THERA-VIT-M) tablet, Take 1 tablet by mouth daily.  nystatin (MYCOSTATIN) 126485 UNIT/GM POWD,   pantoprazole (PROTONIX) 40 MG EC tablet, Take 40 mg by mouth  PANTOPRAZOLE SODIUM PO, Take  by mouth.  pramipexole (MIRAPEX) 0.5 MG tablet,   ROPINIROLE HCL PO, Take  by mouth.  temazepam (RESTORIL) 15 MG capsule, Take 15 mg by mouth nightly as needed for sleep  topiramate (TOPAMAX) 25 MG tablet, Take up to 100 MG  "daily.  traMADol (ULTRAM) 50 MG tablet,   traZODone (DESYREL) 100 MG tablet, Take 200 mg by mouth  valACYclovir (VALTREX) 1000 mg tablet, Take 1 g by mouth    No current facility-administered medications on file prior to visit.           Review of Systems:     Review of Systems   Respiratory:  Positive for cough and sputum production. Negative for hemoptysis, shortness of breath and wheezing.               Exam:   BP (!) 171/80   Pulse 67   Resp 17   Ht 1.692 m (5' 6.6\")   SpO2 99%   BMI 31.70 kg/m        Physical Exam  Vitals and nursing note reviewed.   Constitutional:       Appearance: She is obese.      Comments: Frequent throat clearing, frequent coughing   Cardiovascular:      Rate and Rhythm: Normal rate and regular rhythm.      Heart sounds: No murmur heard.  Pulmonary:      Effort: Pulmonary effort is normal.      Breath sounds: Normal breath sounds. No wheezing, rhonchi or rales.   Musculoskeletal:      Comments: Tight pitting edema in bilateral lower extremities   Neurological:      Mental Status: She is alert.       Fingernails without clubbing         Data:     PFT: 7/26/2023    The FVC, FEV1, and FEV1/FVC ratio within normal limits.  Lung volumes are within normal limits.  The diffusion capacity is in the normal limits, however the diffusion capacity is not corrected for the patient's hemoglobin.    IMPRESSION:  Normal spirometry      CXR 7/19/2023  No acute cardiopulmonary findings.     Video swallow 9/2019  Limited esophagram demonstrates normal esophageal emptying.     Video swallow at [place] 2015  1. Moderate prominence of the cricopharyngeus is probably unrelated to the patient's cough. Please see speech pathology note for additional information. 2. No gastroesophageal reflux witnessed.     2/21/22 normal rhinolaryngoscopy [place]    All studies listed here were independently reviewed and interpreted by me today.          Assessment and Plan:     ## Chronic cough  ## Lower extremity edema  ## " Possible silent reflux  She had a chronic cough for the last 4 to 5 years which has been extensively evaluated through the [facility] and [place] systems including rhinolaryngoscopy and swallow evaluation.  She reports that her cough is worse after eating, worse when supine, and worse when first getting up in the morning.  The cough is generally productive.  As far as I can tell she has not had sputum cultures performed.  Given that her symptoms primarily occur after eating and at night I am concerned about silent reflux.  She is currently taking omeprazole 40 mg twice daily.  In addition to silent reflux, pulmonary edema is a concern given her tight lower extremity edema.  This is being managed by her cardiologist, however it does not sound like the patient is taking her diuretics as ordered.  Per the last note from her [facility] cardiologist it looks like she is supposed be taking torsemide 40 mg daily, however she reports only taking 20 mg daily.  -Sputum culture  -I requested that she discuss her edema with her cardiologist  -If the sputum culture is negative and cough does not resolve after treatment of her edema, she may need a pH probe to assess for silent aspiration.  -If these measures are unrevealing, we may consider bronchoscopy to rule out chronic infection  -She also reports that the cough seems to be triggered from her upper throat so speech therapy will also be pursued if the above measures are unrevealing/ineffective.        ## Health maintenance  COVID x4, most recently in 2022, annual influenza this year, Prevnar in 2016, Pneumovax x3 most recently in 2009, Tdap in 2013      Return to clinic: 3 months      I personally spent 57 minutes on the date of the encounter doing chart review, history and exam, documentation and further activities per the note.    Eric Tucker M.D.  Pulmonary & Critical Care  Pager: Click Here to page      The above note was dictated using voice recognition software and may  include typographical errors. Please contact the author for any clarifications.

## 2023-07-27 LAB
DLCOUNC-%PRED-PRE: 88 %
DLCOUNC-PRE: 16.92 ML/MIN/MMHG
DLCOUNC-PRED: 19.06 ML/MIN/MMHG
ERV-%PRED-PRE: 58 %
ERV-PRE: 0.28 L
ERV-PRED: 0.48 L
EXPTIME-PRE: 7.12 SEC
FEF2575-%PRED-PRE: 89 %
FEF2575-PRE: 1.3 L/SEC
FEF2575-PRED: 1.46 L/SEC
FEFMAX-%PRED-PRE: 109 %
FEFMAX-PRE: 5.29 L/SEC
FEFMAX-PRED: 4.81 L/SEC
FEV1-%PRED-PRE: 91 %
FEV1-PRE: 1.7 L
FEV1FEV6-PRE: 76 %
FEV1FEV6-PRED: 77 %
FEV1FVC-PRE: 76 %
FEV1FVC-PRED: 77 %
FEV1SVC-PRE: 71 %
FEV1SVC-PRED: 63 %
FIFMAX-PRE: 3.19 L/SEC
FRCPLETH-%PRED-PRE: 100 %
FRCPLETH-PRE: 2.84 L
FRCPLETH-PRED: 2.84 L
FVC-%PRED-PRE: 92 %
FVC-PRE: 2.24 L
FVC-PRED: 2.43 L
IC-%PRED-PRE: 86 %
IC-PRE: 2.1 L
IC-PRED: 2.44 L
RVPLETH-%PRED-PRE: 108 %
RVPLETH-PRE: 2.56 L
RVPLETH-PRED: 2.36 L
TLCPLETH-%PRED-PRE: 93 %
TLCPLETH-PRE: 4.94 L
TLCPLETH-PRED: 5.27 L
VA-%PRED-PRE: 73 %
VA-PRE: 3.49 L
VC-%PRED-PRE: 81 %
VC-PRE: 2.38 L
VC-PRED: 2.91 L

## 2023-07-31 ENCOUNTER — TELEPHONE (OUTPATIENT)
Dept: PULMONOLOGY | Facility: CLINIC | Age: 83
End: 2023-07-31
Payer: COMMERCIAL

## 2023-07-31 NOTE — CONFIDENTIAL NOTE
Writer called patient to let her know that she will need a new cup for a sputum sample. Patient stated it is hard for her to get around. Writer told patient that the clinic will send out new sputum cups and she can drop off a sample at any [facility] lab.    Writer contact ALYSE Marroquin in clinic and requested cups to be mailed to patient.

## 2023-07-31 NOTE — TELEPHONE ENCOUNTER
[facility] Call Center    Phone Message    May a detailed message be left on voicemail: yes     Reason for Call: Patient has questing about the phlegm sample she was to turn in when a family member tried to drop it off for her they wouldn't except it and they were heading out of town , now that she his back the sample has dried up she wants to know if she can rinse and reuse the tube?     Action Taken: Other: PULM     Travel Screening: Not Applicable

## 2023-08-05 ENCOUNTER — TELEPHONE (OUTPATIENT)
Dept: PULMONOLOGY | Facility: CLINIC | Age: 83
End: 2023-08-05
Payer: COMMERCIAL

## 2023-08-05 NOTE — TELEPHONE ENCOUNTER
Left Voicemail (1st Attempt) for the patient to call back and schedule the following:    Appointment type: TY  Provider: FATOUMATA  Return date: 10/26/23  Specialty phone number: [phone]  Additional appointment(s) needed: NA  Additonal Notes: NA

## 2023-08-16 ENCOUNTER — TELEPHONE (OUTPATIENT)
Dept: PULMONOLOGY | Facility: CLINIC | Age: 83
End: 2023-08-16
Payer: COMMERCIAL

## 2023-08-16 NOTE — TELEPHONE ENCOUNTER
Patient Contacted     Appointment type: RTN  Provider: FATOUMATA  Return date: 11/8/23  Specialty phone number: [phone]  Additional appointment(s) needed: NA  Additonal Notes: NA

## 2023-09-18 ENCOUNTER — TELEPHONE (OUTPATIENT)
Dept: PULMONOLOGY | Facility: CLINIC | Age: 83
End: 2023-09-18
Payer: COMMERCIAL

## 2023-09-18 NOTE — TELEPHONE ENCOUNTER
[facility] Call [facility]    Phone Message    May a detailed message be left on voicemail: yes     Reason for Call: Other: Per pt will be having a surgery maybe within a month or so here for her back. Per pt would like to ask if there is any way maybe to have see Dr. Bettencourt? Per pt already has a 3 month follow up appt on 11/08/23 but pt would prefer to be sooner due to her back surgery. Please call pt back to discuss. Thank you!     Action Taken: Message routed to:  [facility] PULM    Travel Screening: Not Applicable                                                                     APPTS ARE READY TO BE MADE: [X] YES    Best Family or Patient Contact (if needed):    Additional Information about above appointments (if needed):    1: Primary care provider  2: Cardiology  3: Urology  4: Nephrology    Other comments or requests:   1. Follow-up with urologist Dr. Montenegro, [facility]  2. Follow up with cardiologist Dr. Hagan in 1-2 weeks  3. Follow up with nephrologist [facility] [phone] in 1 week.  3. Follow up with endocrinologist Dr. Dior Monroy in 4 weeks.   APPTS ARE READY TO BE MADE: [X] YES    Best Family or Patient Contact (if needed):    Additional Information about above appointments (if needed):    1: Primary care provider  2: Cardiology  3: Urology  4: Nephrology    Other comments or requests:   1. Follow-up with urologist Dr. Montenegro, [facility]  2. Follow up with cardiologist Dr. Hagan in 1-2 weeks  3. Follow up with nephrologist [facility] [phone] in 1 week.  3. Follow up with endocrinologist Dr. Dior Monroy in 4 weeks.    Patient was scheduled with Dr. Montenegro on 10/31 2:30pm at [address] [place] through the provider's office. Patient advised of appointment details.   Patient was previously scheduled with Dr. Delaney on 9/21 11:30am  at [address]  Patient was previously scheduled with Ld Blair on 10/19 12pm at [address]       APPTS ARE READY TO BE MADE: [X] YES    Best Family or Patient Contact (if needed):    Additional Information about above appointments (if needed):    1: Primary care provider  2: Cardiology  3: Urology  4: Nephrology    Other comments or requests:   1. Follow-up with urologist Dr. Montenegro, [facility]  2. Follow up with cardiologist Dr. Hagan in 1-2 weeks  3. Follow up with nephrologist [facility] [phone] in 1 week.  3. Follow up with endocrinologist Dr. Dior Monroy in 4 weeks.    Patient was scheduled with Dr. Montenegro on 10/31 2:30pm at [address] [place] through the provider's office. Patient advised of appointment details.   Patient was previously scheduled with Dr. Delaney on 9/21 11:30am  at [address]  Patient was previously scheduled with Ld Blair on 10/19 12pm at [address]  Patient was provided with follow up request details and was advised Dr. Kan's office will outreach to schedule their appointment  Patient was provided with follow up request details and was advised Dr. Snyder's office will outreach to schedule their appointment

## 2023-09-29 ENCOUNTER — TELEPHONE (OUTPATIENT)
Dept: PULMONOLOGY | Facility: CLINIC | Age: 83
End: 2023-09-29
Payer: COMMERCIAL

## 2023-09-29 NOTE — TELEPHONE ENCOUNTER
Patient Contacted for the patient to call back and schedule the following:    Appointment type: RTN  Provider: FATOUMATA  Return date: 11/08/23  Specialty phone number: [phone]  Additional appointment(s) needed: N/A   Additonal Notes: Pt requested sooner appointment other than 11/08/23 advised pt that Dr. Tucker does not have anything else available unfortunately . Pt understood

## 2023-09-29 NOTE — TELEPHONE ENCOUNTER
Writer spoke with patient following up request for RN call. Patient would like new sputum cups as her last sample was not able to be processed because it was not refrigerated.    Writer forwarded message to Yen Hu (complex ) to send new sputum cups and directions to patient.

## 2023-11-05 ENCOUNTER — HEALTH MAINTENANCE LETTER (OUTPATIENT)
Age: 83
End: 2023-11-05

## 2023-11-08 ENCOUNTER — OFFICE VISIT (OUTPATIENT)
Dept: PULMONOLOGY | Facility: CLINIC | Age: 83
End: 2023-11-08
Attending: INTERNAL MEDICINE
Payer: COMMERCIAL

## 2023-11-08 VITALS — OXYGEN SATURATION: 96 % | HEART RATE: 64 BPM | DIASTOLIC BLOOD PRESSURE: 80 MMHG | SYSTOLIC BLOOD PRESSURE: 150 MMHG

## 2023-11-08 DIAGNOSIS — R05.3 CHRONIC COUGH: Primary | ICD-10-CM

## 2023-11-08 PROCEDURE — G0463 HOSPITAL OUTPT CLINIC VISIT: HCPCS | Performed by: INTERNAL MEDICINE

## 2023-11-08 PROCEDURE — 99215 OFFICE O/P EST HI 40 MIN: CPT | Performed by: INTERNAL MEDICINE

## 2023-11-08 RX ORDER — FLUTICASONE PROPIONATE 50 MCG
1 SPRAY, SUSPENSION (ML) NASAL 2 TIMES DAILY
Qty: 16 G | Refills: 3 | Status: SHIPPED | OUTPATIENT
Start: 2023-11-08 | End: 2024-01-02

## 2023-11-08 RX ORDER — TORSEMIDE 20 MG/1
20 TABLET ORAL DAILY
COMMUNITY

## 2023-11-08 ASSESSMENT — ENCOUNTER SYMPTOMS
HEMOPTYSIS: 1
FEVER: 0
CHILLS: 0
SHORTNESS OF BREATH: 1
COUGH: 1
SPUTUM PRODUCTION: 1
WHEEZING: 1

## 2023-11-08 ASSESSMENT — PAIN SCALES - GENERAL: PAINLEVEL: MILD PAIN (2)

## 2023-11-08 NOTE — PROGRESS NOTES
Pulmonology Clinic Follow up Visit       Elvis Cruz MRN# [id number]   YOB: 1940 Age: 83 year old     Date of Visit: 11/8/2023    Reason for Visit: Follow-up chronic cough          Assessment and Plan:       ## Chronic cough  ## Lower extremity edema  ## Possible silent reflux  She had a chronic cough for the last 4 to 5 years which has been extensively evaluated through the [facility] and [facility] systems including rhinolaryngoscopy and swallow evaluation.  She reports that her cough is worse after eating, worse when supine, and worse when first getting up in the morning.  The cough is generally productive.  As far as I can tell she has not had sputum cultures performed.  Given that her symptoms primarily occur after eating and at night I am concerned about silent reflux.  She is currently taking omeprazole 40 mg twice daily.  In addition to silent reflux, pulmonary edema is a concern given her tight lower extremity edema and edema seen on CTs.  This is being managed by her cardiologist, however she is not taking her torsemide or wearing compression socks. Previous PFT's WNL.  Has lisinopril listed in her med list but confirmed that she is actually using losartan.    -Sputum culture     - Contaminated with oral cells, will repeat  - Change advair to Dulera as advair isn't covered by insurance  - Restart flonase  -I again requested that she discuss her edema with her cardiologist  - Recommended that she begin taking diuretic until she can discuss with her cardiologist  -If the sputum culture is negative and cough does not resolve after treatment of her edema, she may need a pH probe to assess for silent aspiration.  -If these measures are unrevealing, we may consider bronchoscopy to rule out chronic infection  -She also reports that the cough seems to be triggered from her upper throat so speech therapy will also be pursued if the above measures are unrevealing/ineffective.        ## Health  maintenance  COVID x4, most recently in 2022, annual influenza this year, Prevnar in 2016, Pneumovax x3 most recently in 2009, Tdap in 2013        Return to clinic: 6 months    I personally spent 42 minutes on the date of the encounter doing chart review, history and exam, documentation and further activities per the note.      Eric Tucker M.D.  Pulmonary & Critical Care  Pager: Click Here to page          History of Present Illness / Interval History:     Elvis Cruz is a 83 year old female with H/O anxiety, GERD, PE on Eliquis, lower extremity edema, hard of hearing, LUBNA intolerant of CPAP, and chronic cough who presents to discuss her cough.     Last seen: 7/26/2023 when she establish care.    Sputum culture ordered at the last visit was not completed by the patient.    Continues to have trouble with cough, coughed for an hour straight y'day AM    One episode of coughing up blood about a month ago     Mucous is frequently brown at first, then yellow as things get cleared out    Cough frequently stimulated by eating, sometimes by turning head to the right    Frequently occurs overnight, wakes from sleep, feels that she's choking     Still sleeping upright     Cough is very productive     Got Advair 115-21 from PCP, only using occasionally    Dayquil seems to help    Still has tight pitting edema in BLE up to the mid thigh    Followed by cards at [facility], last seen 6/23/23. Lasix changed to torsemide   Not using compression socks as she can't get them on and they're quite painful. Also has trouble removing them.    Lives with  and son  Son is on the spectrum but works as care taker    Not taking torsemide due to excessive urination  Curently only taking about once a week  Is tring to reduce salt intake, though does frequently eat crackers    Tried to bring in sample but was rejected, was mailed 2 more cups, lost one, then produced one sample while out of town and was unrefridgerated too long to  be accepted              Review of Systems:     Review of Systems   Constitutional:  Negative for chills, fever and malaise/fatigue.        Weight gain   Respiratory:  Positive for cough, hemoptysis, sputum production, shortness of breath and wheezing.             Physical Examination:     BP (!) 150/80   Pulse 64   SpO2 96%     Physical Exam  Vitals and nursing note reviewed.   Constitutional:       Appearance: Normal appearance. She is obese.   Cardiovascular:      Rate and Rhythm: Normal rate and regular rhythm.      Heart sounds: No murmur heard.  Pulmonary:      Effort: Pulmonary effort is normal.      Breath sounds: Normal breath sounds. No wheezing, rhonchi or rales.   Musculoskeletal:      Comments: Tight pitting edema in BLE up to the mid thigh   Neurological:      Mental Status: She is alert.           Fingernails without clubbing        Data:     No significant new data since last seen        Medications:     Current Outpatient Medications   Medication    apixaban ANTICOAGULANT (ELIQUIS) 5 MG tablet    atorvastatin (LIPITOR) 10 MG tablet    azelastine (ASTELIN) 0.1 % nasal spray    chlorpheniramine (CHLOR-TRIMETON) 4 MG tablet    cycloSPORINE (RESTASIS) 0.05 % ophthalmic emulsion    diphenhydrAMINE-acetaminophen (TYLENOL PM)  MG tablet    doxylamine (UNISOM) 25 MG TABS tablet    famotidine (PEPCID) 40 MG tablet    finasteride (PROSCAR) 5 MG tablet    fluocinolone acetonide (DERMA SMOOTHE/FS BODY) 0.01 % external oil    fluticasone (FLONASE) 50 MCG/ACT nasal spray    fluticasone-salmeterol (ADVAIR HFA) 115-21 MCG/ACT inhaler    furosemide (LASIX) 20 MG tablet    hydrochlorothiazide (MICROZIDE) 12.5 MG capsule    HYDROcodone-acetaminophen (NORCO) 5-325 MG tablet    hydrocortisone 2.5 % cream    ibuprofen (ADVIL/MOTRIN) 200 MG tablet    ipratropium (ATROVENT) 0.06 % nasal spray    ketoconazole (NIZORAL) 2 % external shampoo    lisinopril (ZESTRIL) 10 MG tablet    LORazepam (ATIVAN) 0.5 MG tablet     losartan (COZAAR) 25 MG tablet    meloxicam (MOBIC) 7.5 MG tablet    metroNIDAZOLE (METROGEL) 1 % gel    minocycline (MINOCIN,DYNACIN) 100 MG capsule    minocycline (MINOCIN/DYNACIN) 100 MG capsule    mometasone (ELOCON) 0.1 % external solution    multivitamin w/minerals (THERA-VIT-M) tablet    nystatin (MYCOSTATIN) 020053 UNIT/GM POWD    omeprazole (PRILOSEC) 40 MG DR capsule    oxyCODONE-acetaminophen (PERCOCET) 5-325 MG tablet    pantoprazole (PROTONIX) 40 MG EC tablet    PANTOPRAZOLE SODIUM PO    pramipexole (MIRAPEX) 0.5 MG tablet    ROPINIROLE HCL PO    SUMAtriptan (IMITREX) 100 MG tablet    temazepam (RESTORIL) 15 MG capsule    topiramate (TOPAMAX) 25 MG tablet    torsemide (DEMADEX) 20 MG tablet    traMADol (ULTRAM) 50 MG tablet    traZODone (DESYREL) 100 MG tablet    valACYclovir (VALTREX) 1000 mg tablet    vitamin D3 (CHOLECALCIFEROL) 125 MCG (5000 UT) tablet     No current facility-administered medications for this visit.             The above note was dictated using voice recognition software and may include typographical errors. Please contact the author for any clarifications.

## 2023-11-08 NOTE — NURSING NOTE
Chief Complaint   Patient presents with    RECHECK     3 month follow up     Delmy Gonzalez CMA at 7:43 AM on 11/8/2023.

## 2023-11-08 NOTE — LETTER
11/8/2023         RE: Elvis Cruz  [address]  [place] [address]        Dear Colleague,    Thank you for referring your patient, Elvis Cruz, to the [facility] FOR LUNG SCIENCE AND [facility] [place]. Please see a copy of my visit note below.    Pulmonology Clinic Follow up Visit       Elvis Cruz MRN# [id number]   YOB: 1940 Age: 83 year old     Date of Visit: 11/8/2023    Reason for Visit: Follow-up chronic cough          Assessment and Plan:       ## Chronic cough  ## Lower extremity edema  ## Possible silent reflux  She had a chronic cough for the last 4 to 5 years which has been extensively evaluated through the [facility] including rhinolaryngoscopy and swallow evaluation.  She reports that her cough is worse after eating, worse when supine, and worse when first getting up in the morning.  The cough is generally productive.  As far as I can tell she has not had sputum cultures performed.  Given that her symptoms primarily occur after eating and at night I am concerned about silent reflux.  She is currently taking omeprazole 40 mg twice daily.  In addition to silent reflux, pulmonary edema is a concern given her tight lower extremity edema and edema seen on CTs.  This is being managed by her cardiologist, however she is not taking her torsemide or wearing compression socks. Previous PFT's WNL.  Has lisinopril listed in her med list but confirmed that she is actually using losartan.    -Sputum culture     - Contaminated with oral cells, will repeat  - Change advair to Dulera as advair isn't covered by insurance  - Restart flonase  -I again requested that she discuss her edema with her cardiologist  - Recommended that she begin taking diuretic until she can discuss with her cardiologist  -If the sputum culture is negative and cough does not resolve after treatment of her edema, she may need a pH probe to assess for silent  aspiration.  -If these measures are unrevealing, we may consider bronchoscopy to rule out chronic infection  -She also reports that the cough seems to be triggered from her upper throat so speech therapy will also be pursued if the above measures are unrevealing/ineffective.        ## Health maintenance  COVID x4, most recently in 2022, annual influenza this year, Prevnar in 2016, Pneumovax x3 most recently in 2009, Tdap in 2013        Return to clinic: 6 months    I personally spent 42 minutes on the date of the encounter doing chart review, history and exam, documentation and further activities per the note.      Eric Tucker M.D.  Pulmonary & Critical Care  Pager: Click Here to page          History of Present Illness / Interval History:     Elvis Cruz is a 83 year old female with H/O anxiety, GERD, PE on Eliquis, lower extremity edema, hard of hearing, LUBNA intolerant of CPAP, and chronic cough who presents to discuss her cough.     Last seen: 7/26/2023 when she establish care.    Sputum culture ordered at the last visit was not completed by the patient.    Continues to have trouble with cough, coughed for an hour straight y'day AM    One episode of coughing up blood about a month ago     Mucous is frequently brown at first, then yellow as things get cleared out    Cough frequently stimulated by eating, sometimes by turning head to the right    Frequently occurs overnight, wakes from sleep, feels that she's choking     Still sleeping upright     Cough is very productive     Got Advair 115-21 from PCP, only using occasionally    Dayquil seems to help    Still has tight pitting edema in BLE up to the mid thigh    Followed by cards at [facility], last seen 6/23/23. Lasix changed to torsemide   Not using compression socks as she can't get them on and they're quite painful. Also has trouble removing them.    Lives with  and son  Son is on the spectrum but works as care taker    Not taking torsemide due  to excessive urination  Curently only taking about once a week  Is tring to reduce salt intake, though does frequently eat crackers    Tried to bring in sample but was rejected, was mailed 2 more cups, lost one, then produced one sample while out of town and was unrefridgerated too long to be accepted              Review of Systems:     Review of Systems   Constitutional:  Negative for chills, fever and malaise/fatigue.        Weight gain   Respiratory:  Positive for cough, hemoptysis, sputum production, shortness of breath and wheezing.             Physical Examination:     BP (!) 150/80   Pulse 64   SpO2 96%     Physical Exam  Vitals and nursing note reviewed.   Constitutional:       Appearance: Normal appearance. She is obese.   Cardiovascular:      Rate and Rhythm: Normal rate and regular rhythm.      Heart sounds: No murmur heard.  Pulmonary:      Effort: Pulmonary effort is normal.      Breath sounds: Normal breath sounds. No wheezing, rhonchi or rales.   Musculoskeletal:      Comments: Tight pitting edema in BLE up to the mid thigh   Neurological:      Mental Status: She is alert.           Fingernails without clubbing        Data:     No significant new data since last seen        Medications:     Current Outpatient Medications   Medication    apixaban ANTICOAGULANT (ELIQUIS) 5 MG tablet    atorvastatin (LIPITOR) 10 MG tablet    azelastine (ASTELIN) 0.1 % nasal spray    chlorpheniramine (CHLOR-TRIMETON) 4 MG tablet    cycloSPORINE (RESTASIS) 0.05 % ophthalmic emulsion    diphenhydrAMINE-acetaminophen (TYLENOL PM)  MG tablet    doxylamine (UNISOM) 25 MG TABS tablet    famotidine (PEPCID) 40 MG tablet    finasteride (PROSCAR) 5 MG tablet    fluocinolone acetonide (DERMA SMOOTHE/FS BODY) 0.01 % external oil    fluticasone (FLONASE) 50 MCG/ACT nasal spray    fluticasone-salmeterol (ADVAIR HFA) 115-21 MCG/ACT inhaler    furosemide (LASIX) 20 MG tablet    hydrochlorothiazide (MICROZIDE) 12.5 MG capsule     HYDROcodone-acetaminophen (NORCO) 5-325 MG tablet    hydrocortisone 2.5 % cream    ibuprofen (ADVIL/MOTRIN) 200 MG tablet    ipratropium (ATROVENT) 0.06 % nasal spray    ketoconazole (NIZORAL) 2 % external shampoo    lisinopril (ZESTRIL) 10 MG tablet    LORazepam (ATIVAN) 0.5 MG tablet    losartan (COZAAR) 25 MG tablet    meloxicam (MOBIC) 7.5 MG tablet    metroNIDAZOLE (METROGEL) 1 % gel    minocycline (MINOCIN,DYNACIN) 100 MG capsule    minocycline (MINOCIN/DYNACIN) 100 MG capsule    mometasone (ELOCON) 0.1 % external solution    multivitamin w/minerals (THERA-VIT-M) tablet    nystatin (MYCOSTATIN) 027685 UNIT/GM POWD    omeprazole (PRILOSEC) 40 MG DR capsule    oxyCODONE-acetaminophen (PERCOCET) 5-325 MG tablet    pantoprazole (PROTONIX) 40 MG EC tablet    PANTOPRAZOLE SODIUM PO    pramipexole (MIRAPEX) 0.5 MG tablet    ROPINIROLE HCL PO    SUMAtriptan (IMITREX) 100 MG tablet    temazepam (RESTORIL) 15 MG capsule    topiramate (TOPAMAX) 25 MG tablet    torsemide (DEMADEX) 20 MG tablet    traMADol (ULTRAM) 50 MG tablet    traZODone (DESYREL) 100 MG tablet    valACYclovir (VALTREX) 1000 mg tablet    vitamin D3 (CHOLECALCIFEROL) 125 MCG (5000 UT) tablet     No current facility-administered medications for this visit.             The above note was dictated using voice recognition software and may include typographical errors. Please contact the author for any clarifications.      Eric Tucker MD

## 2023-11-08 NOTE — PATIENT INSTRUCTIONS
"Be sure your taking your torsemide, the \"pee pill\".    Try to use your compression socks as much as possible.    Stop taking advair inhaler.    Start taking dulera inhaler. Use the dulera 2 puffs in the morning and 2 puffs in the evening.    Restart using the flonase.  "

## 2023-11-15 ENCOUNTER — APPOINTMENT (OUTPATIENT)
Dept: LAB | Facility: CLINIC | Age: 83
End: 2023-11-15
Payer: COMMERCIAL

## 2023-11-15 PROCEDURE — 87070 CULTURE OTHR SPECIMN AEROBIC: CPT | Performed by: INTERNAL MEDICINE

## 2023-12-11 ENCOUNTER — TELEPHONE (OUTPATIENT)
Dept: PULMONOLOGY | Facility: CLINIC | Age: 83
End: 2023-12-11
Payer: COMMERCIAL

## 2023-12-11 DIAGNOSIS — R05.3 CHRONIC COUGH: Primary | ICD-10-CM

## 2023-12-11 NOTE — TELEPHONE ENCOUNTER
Writer called and left message for patient stating message from Dr. Tucker:    This patient had her sputum contaminated with saliva. Will you please send her a new sputum cup for a repeat sample. Thx     Writer forwarded message to Yen Lui to send patient new sputum cups with directions.

## 2023-12-28 ENCOUNTER — TELEPHONE (OUTPATIENT)
Dept: AUDIOLOGY | Facility: CLINIC | Age: 83
End: 2023-12-28
Payer: COMMERCIAL

## 2023-12-28 NOTE — TELEPHONE ENCOUNTER
Spoke to Esthela at [facility] in [facility]. She states that Yonis Guallpa is not a doctor of audiology, but a hearing aid dispenser and Veda's last audio was done in 2021. She is going to try to fax to [facility] (writer).    Eyal COLE 12/28

## 2023-12-28 NOTE — TELEPHONE ENCOUNTER
[facility] Call [facility]    Phone Message    May a detailed message be left on voicemail: yes     Reason for Call: Other: The pt is interested in hearing implants but does not know which type to request. She would like a call back to discuss. The pt is seen at the [facility] for other specialties so wants to be seen at the [facility]. Thanks.     Action Taken: Message routed to:  [facility] ([facility]): AUDIO    Travel Screening: Not Applicable

## 2023-12-28 NOTE — TELEPHONE ENCOUNTER
Veda called as she would like information on her hearing loss and options. She says background noise is impossible and I notified her that unfortunately, implanted & hearing aid devices do not improve this. Spoke about how I will try to request past audiogram from audiologist for review and scheduled HEV per audiology recommendation. I will contact her if able to get audio and and addtl input.    -Chelsea COLE 12/28/23    Intake    Referring Provider and clinic:  self referred    [facility] ([facility]) - phone [phone]   Yonis Guallpa    Do you wear hearing aids: 40 years, currently uses [employer]ia blunt's    Have you ever had ear surgery: Dr. 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 (Can't remember exactly)    Have you had any imaging done of your head: CT head  1/15/18 Edil Richards (encounter in epic - may need to request)    Have you had an Audiogram done in the last 6 months: Doesn't think so in last 6 months.     What is the best way to contact you- Phone, Mychart or email?  Prefers phone, please speak slowly    If we need you to sign a release of information to obtain your records would you prefer that be mailed to you or emailed?    [email or web address]

## 2023-12-30 DIAGNOSIS — R05.3 CHRONIC COUGH: ICD-10-CM

## 2024-01-02 RX ORDER — FLUTICASONE PROPIONATE 50 MCG
1 SPRAY, SUSPENSION (ML) NASAL 2 TIMES DAILY
Qty: 24 ML | Refills: 1 | Status: SHIPPED | OUTPATIENT
Start: 2024-01-02

## 2024-01-17 ENCOUNTER — TELEPHONE (OUTPATIENT)
Dept: PULMONOLOGY | Facility: CLINIC | Age: 84
End: 2024-01-17
Payer: COMMERCIAL

## 2024-01-17 NOTE — TELEPHONE ENCOUNTER
[facility]    Phone Message    May a detailed message be left on voicemail: yes     Reason for Call: Other: She has failed a spit test twice now and she needs instruction on what she is doing wrong. Please call Veda directly on her phone number to explain and assist.     Action Taken: Other: Pulm    Travel Screening: Not Applicable

## 2024-01-17 NOTE — TELEPHONE ENCOUNTER
Returned patient call to go over proper sputum collection. Left clinic number, and sent [facility] message

## 2024-01-24 ENCOUNTER — OFFICE VISIT (OUTPATIENT)
Dept: PULMONOLOGY | Facility: CLINIC | Age: 84
End: 2024-01-24
Attending: INTERNAL MEDICINE
Payer: COMMERCIAL

## 2024-01-24 VITALS — DIASTOLIC BLOOD PRESSURE: 84 MMHG | OXYGEN SATURATION: 95 % | SYSTOLIC BLOOD PRESSURE: 151 MMHG | HEART RATE: 69 BPM

## 2024-01-24 DIAGNOSIS — I50.1: ICD-10-CM

## 2024-01-24 DIAGNOSIS — R05.3 CHRONIC COUGH: Primary | ICD-10-CM

## 2024-01-24 PROCEDURE — 99214 OFFICE O/P EST MOD 30 MIN: CPT | Performed by: INTERNAL MEDICINE

## 2024-01-24 PROCEDURE — G0463 HOSPITAL OUTPT CLINIC VISIT: HCPCS | Performed by: INTERNAL MEDICINE

## 2024-01-24 ASSESSMENT — PAIN SCALES - GENERAL: PAINLEVEL: NO PAIN (0)

## 2024-01-24 NOTE — LETTER
1/24/2024         RE: Elvis Cruz  [address]  [facility] [address]        Dear Colleague,    Thank you for referring your patient, Elvis Cruz, to the [facility] FOR LUNG [facility]ENCE AND [facility] [place]. Please see a copy of my visit note below.    Pulmonology Clinic Follow up Visit       Elvis Cruz MRN# [id number]   YOB: 1940 Age: 83 year old     Date of Visit: 1/24/2024    Reason for Visit: Follow-up chronic cough          Assessment and Plan:       ## Chronic cough  ## Lower extremity edema  ## Possible silent reflux  She had a chronic cough for the last 4 to 5 years which has been extensively evaluated through the [facility] and [facility] systems including rhinolaryngoscopy and swallow evaluation.  She reports that her cough is worse after eating, worse when supine, and worse when first getting up in the morning.  The cough is generally productive.  As far as I can tell she has not had sputum cultures performed.  Given that her symptoms primarily occur after eating and at night I am concerned about silent reflux.  She is currently taking omeprazole 40 mg twice daily.  In addition to silent reflux, pulmonary edema is a concern given her tight lower extremity edema and edema seen on CTs.  This is being managed by her cardiologist, however she is not taking her torsemide or wearing compression socks. Previous PFT's WNL.  Has lisinopril listed in her med list but confirmed that she is actually using losartan.     -Sputum culture     - Contaminated with oral cells, will repeat  - Change advair to Dulera as advair isn't covered by insurance  - Restart flonase  -I again requested that she discuss her edema with her cardiologist  - Recommended that she begin taking diuretic until she can discuss with her cardiologist  -If the sputum culture is negative and cough does not resolve after treatment of her edema, she may need a pH probe to assess for  silent aspiration.  -If these measures are unrevealing, we may consider bronchoscopy to rule out chronic infection  -She also reports that the cough seems to be triggered from her upper throat so speech therapy will also be pursued if the above measures are unrevealing/ineffective.           ## Health maintenance  COVID x4, most recently in 2022, annual influenza this year, Prevnar in 2016, Pneumovax x3 most recently in 2009, Tdap in 2013        ## Health maintenance ***    Return to clinic: ***    I personally spent *** minutes on the date of the encounter doing chart review, history and exam, documentation and further activities per the note.      Eric Tucker M.D.  Pulmonary & Critical Care  Pager: Click Here to page          History of Present Illness / Interval History:     Elvis Cruz is a 83 year old female with H/O anxiety, GERD, PE on Eliquis, lower extremity edema, hard of hearing, LUBNA intolerant of CPAP, and chronic cough who presents to discuss her cough.      Last seen: 11/8/23      Repeat culture not done    Dry mouth, particulalry in the morning     Still sleeping semiupright    Cough frequently triggered by eating    laryngopharyngeal reflux     Continues to have GERD despite omeprazole BID    EGD [facility] 12/13/21  Impressions/Post-Op Diagnosis:        - Normal esophagus. Dilated.        - Normal stomach.        - Normal examined duodenum.     Recommendation:       - Discharge patient to home.        - Resume regular diet today.        - Continue omeprazole 40 mg twice per day.        - Follow-up as scheduled with Kristy Hoffman at [facility] on 1/10/22 (1:45 PM        appointment).       VIDEO SWALLOWING EVALUATION   9/5/2019 11:39 AM [place]     HISTORY: Dysphagia, unspecified type.     COMPARISON: None.     FLUOROSCOPY TIME: 1.2 minutes      Number of cine runs: 7     FINDINGS:    Thin: Normal.     Nectar: Not administered.     Honey: Not administered.     Pudding: Normal.     Semisolid:  Normal.     Solid: Normal.     Limited esophagram demonstrates normal esophageal emptying.      VIDEO SWALLOWING EVALUATION  2/4/2013 2:26 PM      HISTORY: Cough.      COMPARISON: None.      FLUOROSCOPY TIME: 1 minute.      FINDINGS:   Thin: Normal.      Nectar: Not administered.      Honey: Not administered.      Pudding: Normal.      Semisolid: Mild base of tongue residual. Otherwise normal.      Solid: Not administered.      Limited esophagram demonstrates decreased esophageal motility with   delayed esophageal emptying.      This study includes only the cervical esophagus.     Torsemide 40mg through cards    Consider repeat swallow eval  Consider bronch        Review of Systems:     ROS         Physical Examination:     BP (!) 151/84 (BP Location: Right arm, Patient Position: Sitting, Cuff Size: Adult Large)   Pulse 69   SpO2 95%     Physical Exam  Fingernails without*** clubbing    Clear         Data:     PFT: ***    CXR:       All studies listed here were independently reviewed and interpreted by me today. ***        Medications:     Current Outpatient Medications   Medication     apixaban ANTICOAGULANT (ELIQUIS) 5 MG tablet     atorvastatin (LIPITOR) 10 MG tablet     azelastine (ASTELIN) 0.1 % nasal spray     chlorpheniramine (CHLOR-TRIMETON) 4 MG tablet     cycloSPORINE (RESTASIS) 0.05 % ophthalmic emulsion     diphenhydrAMINE-acetaminophen (TYLENOL PM)  MG tablet     doxylamine (UNISOM) 25 MG TABS tablet     famotidine (PEPCID) 40 MG tablet     finasteride (PROSCAR) 5 MG tablet     fluocinolone acetonide (DERMA SMOOTHE/FS BODY) 0.01 % external oil     fluticasone (FLONASE) 50 MCG/ACT nasal spray     furosemide (LASIX) 20 MG tablet     hydrochlorothiazide (MICROZIDE) 12.5 MG capsule     HYDROcodone-acetaminophen (NORCO) 5-325 MG tablet     hydrocortisone 2.5 % cream     ibuprofen (ADVIL/MOTRIN) 200 MG tablet     ipratropium (ATROVENT) 0.06 % nasal spray     ketoconazole (NIZORAL) 2 % external shampoo      lisinopril (ZESTRIL) 10 MG tablet     LORazepam (ATIVAN) 0.5 MG tablet     losartan (COZAAR) 25 MG tablet     meloxicam (MOBIC) 7.5 MG tablet     metroNIDAZOLE (METROGEL) 1 % gel     minocycline (MINOCIN,DYNACIN) 100 MG capsule     minocycline (MINOCIN/DYNACIN) 100 MG capsule     mometasone (ELOCON) 0.1 % external solution     mometasone-formoterol (DULERA) 200-5 MCG/ACT inhaler     multivitamin w/minerals (THERA-VIT-M) tablet     nystatin (MYCOSTATIN) 506639 UNIT/GM POWD     omeprazole (PRILOSEC) 40 MG DR capsule     oxyCODONE-acetaminophen (PERCOCET) 5-325 MG tablet     pantoprazole (PROTONIX) 40 MG EC tablet     PANTOPRAZOLE SODIUM PO     pramipexole (MIRAPEX) 0.5 MG tablet     ROPINIROLE HCL PO     SUMAtriptan (IMITREX) 100 MG tablet     temazepam (RESTORIL) 15 MG capsule     topiramate (TOPAMAX) 25 MG tablet     torsemide (DEMADEX) 20 MG tablet     torsemide (DEMADEX) 20 MG tablet     traMADol (ULTRAM) 50 MG tablet     traZODone (DESYREL) 100 MG tablet     valACYclovir (VALTREX) 1000 mg tablet     vitamin D3 (CHOLECALCIFEROL) 125 MCG (5000 UT) tablet     No current facility-administered medications for this visit.             The above note was dictated using voice recognition software and may include typographical errors. Please contact the author for any clarifications.        Again, thank you for allowing me to participate in the care of your patient.        Sincerely,        Eric Tucker MD

## 2024-01-24 NOTE — PROGRESS NOTES
Pulmonology Clinic Follow up Visit       Elvis Cruz MRN# [id number]   YOB: 1940 Age: 83 year old     Date of Visit: 1/24/2024    Reason for Visit: Follow-up chronic cough          Assessment and Plan:       ## Chronic cough  ## Lower extremity edema  ## Possible silent reflux  She had a chronic cough for the last 4 to 5 years which has been extensively evaluated through the [facility] and [facility] systems including rhinolaryngoscopy and swallow evaluation. Negative swallow, but irritation on laryngoscopy.  She reports that her cough is worse after eating, worse when supine, and worse when first getting up in the morning.  The cough is generally productive.  As far as I can tell she has not had sputum cultures performed.  Given that her symptoms primarily occur after eating and at night I am concerned about silent reflux.  She is currently taking omeprazole 40 mg twice daily.  In addition to silent reflux, pulmonary edema is a concern given her frequent lower extremity edema and edema seen on CTs.  This is being managed by her cardiologist however she is only intermittently compliant with diuretics. Previous PFT's WNL.  Has lisinopril listed in her med list but confirmed that she is actually using losartan.     -Sputum culture     - First sample contaminated with oral cells, awaiting repeat  - Continue dulera   - Restart flonase  -I again requested that she discuss her edema with her cardiologist    - Recommended reducing fluid and salt intake     - Recommended she take an extra dose of diuretics for the next 4 days  -Referred to GI for refractory GERD and irritation on laryngoscopy   -If these measures are unrevealing, we may consider bronchoscopy to rule out chronic infection  -She also reports that the cough seems to be triggered from her upper throat so speech therapy will also be pursued if the above measures are unrevealing/ineffective.           ## Health maintenance  COVID x4, most recently in  2022, annual influenza this year, Prevnar in 2016, Pneumovax x3 most recently in 2009, Tdap in 2013          Return to clinic: 3 months    I personally spent 32 minutes on the date of the encounter doing chart review, history and exam, documentation and further activities per the note.      Eric Tucker M.D.  Pulmonary & Critical Care  Pager: Click Here to page          History of Present Illness / Interval History:     Elvis Cruz is a 83 year old female with H/O anxiety, GERD, PE on Eliquis, lower extremity edema, hard of hearing, LUBNA intolerant of CPAP, and chronic cough who presents to discuss her cough.      Last seen: 11/8/23    Sputum cultures ordered at last visit have not yet been completed.         Continues to have frequent cough, particularly after eating and has dry mouth, especially in the morning. Continues to sleep semi-upright.    Continues to have GERD despite omeprazole BID  Also on torsemide 40mg through cards.      Torsemide 40mg through cards    Consider repeat swallow eval  Consider bronch        Review of Systems:     Review of Systems   Constitutional:  Negative for chills, fever and weight loss.   Respiratory:  Positive for cough. Negative for wheezing.    Cardiovascular:  Positive for leg swelling.   Gastrointestinal:  Positive for heartburn.            Physical Examination:     BP (!) 151/84 (BP Location: Right arm, Patient Position: Sitting, Cuff Size: Adult Large)   Pulse 69   SpO2 95%     Physical Exam  Vitals and nursing note reviewed.   Constitutional:       Appearance: Normal appearance.   Cardiovascular:      Rate and Rhythm: Normal rate and regular rhythm.      Heart sounds: No murmur heard.  Pulmonary:      Effort: Pulmonary effort is normal.      Breath sounds: Normal breath sounds.   Musculoskeletal:      Comments: Bilat LLE edema   Neurological:      Mental Status: She is alert.           Data:     EGD [facility] 12/13/21  Impressions/Post-Op Diagnosis:        -  Normal esophagus. Dilated.        - Normal stomach.        - Normal examined duodenum.     Recommendation:       - Discharge patient to home.        - Resume regular diet today.        - Continue omeprazole 40 mg twice per day.        - Follow-up as scheduled with Kristy Hoffman at [facility] on 1/10/22 (1:45 PM        appointment).       VIDEO SWALLOWING EVALUATION   9/5/2019 11:39 AM [place]     HISTORY: Dysphagia, unspecified type.  COMPARISON: None.  FLUOROSCOPY TIME: 1.2 minutes   Number of cine runs: 7  FINDINGS:    Thin: Normal.  Nectar: Not administered.  Honey: Not administered.  Pudding: Normal.  Semisolid: Normal.  Solid: Normal.     Limited esophagram demonstrates normal esophageal emptying.        VIDEO SWALLOWING EVALUATION  2/4/2013 2:26 PM      HISTORY: Cough.   COMPARISON: None.   FLUOROSCOPY TIME: 1 minute.   FINDINGS:   Thin: Normal.   Nectar: Not administered.   Honey: Not administered.   Pudding: Normal.   Semisolid: Mild base of tongue residual. Otherwise normal.   Solid: Not administered.   Limited esophagram demonstrates decreased esophageal motility with   delayed esophageal emptying.          All studies listed here were independently reviewed and interpreted by me today.         Medications:     Current Outpatient Medications   Medication    apixaban ANTICOAGULANT (ELIQUIS) 5 MG tablet    atorvastatin (LIPITOR) 10 MG tablet    azelastine (ASTELIN) 0.1 % nasal spray    chlorpheniramine (CHLOR-TRIMETON) 4 MG tablet    cycloSPORINE (RESTASIS) 0.05 % ophthalmic emulsion    diphenhydrAMINE-acetaminophen (TYLENOL PM)  MG tablet    doxylamine (UNISOM) 25 MG TABS tablet    famotidine (PEPCID) 40 MG tablet    finasteride (PROSCAR) 5 MG tablet    fluocinolone acetonide (DERMA SMOOTHE/FS BODY) 0.01 % external oil    fluticasone (FLONASE) 50 MCG/ACT nasal spray    furosemide (LASIX) 20 MG tablet    hydrochlorothiazide (MICROZIDE) 12.5 MG capsule    HYDROcodone-acetaminophen (NORCO) 5-325 MG tablet     hydrocortisone 2.5 % cream    ibuprofen (ADVIL/MOTRIN) 200 MG tablet    ipratropium (ATROVENT) 0.06 % nasal spray    ketoconazole (NIZORAL) 2 % external shampoo    lisinopril (ZESTRIL) 10 MG tablet    LORazepam (ATIVAN) 0.5 MG tablet    losartan (COZAAR) 25 MG tablet    meloxicam (MOBIC) 7.5 MG tablet    metroNIDAZOLE (METROGEL) 1 % gel    minocycline (MINOCIN,DYNACIN) 100 MG capsule    minocycline (MINOCIN/DYNACIN) 100 MG capsule    mometasone (ELOCON) 0.1 % external solution    mometasone-formoterol (DULERA) 200-5 MCG/ACT inhaler    multivitamin w/minerals (THERA-VIT-M) tablet    nystatin (MYCOSTATIN) 816945 UNIT/GM POWD    omeprazole (PRILOSEC) 40 MG DR capsule    oxyCODONE-acetaminophen (PERCOCET) 5-325 MG tablet    pantoprazole (PROTONIX) 40 MG EC tablet    PANTOPRAZOLE SODIUM PO    pramipexole (MIRAPEX) 0.5 MG tablet    ROPINIROLE HCL PO    SUMAtriptan (IMITREX) 100 MG tablet    temazepam (RESTORIL) 15 MG capsule    topiramate (TOPAMAX) 25 MG tablet    torsemide (DEMADEX) 20 MG tablet    torsemide (DEMADEX) 20 MG tablet    traMADol (ULTRAM) 50 MG tablet    traZODone (DESYREL) 100 MG tablet    valACYclovir (VALTREX) 1000 mg tablet    vitamin D3 (CHOLECALCIFEROL) 125 MCG (5000 UT) tablet     No current facility-administered medications for this visit.             The above note was dictated using voice recognition software and may include typographical errors. Please contact the author for any clarifications.

## 2024-01-24 NOTE — PATIENT INSTRUCTIONS
Try reducing fluid and salt.    Talk to your cardiologist about the fluid.    Take an extra dose of lasix every day for the next 4 days

## 2024-01-24 NOTE — NURSING NOTE
Chief Complaint   Patient presents with    Follow Up     2 month follow up      Vitals were taken and medications were reconciled.     Robyn Moore RMA  2:00 PM

## 2024-02-19 ASSESSMENT — ENCOUNTER SYMPTOMS
WEIGHT LOSS: 0
HEARTBURN: 1
WHEEZING: 0
FEVER: 0
CHILLS: 0
COUGH: 1

## 2024-03-20 ENCOUNTER — TRANSFERRED RECORDS (OUTPATIENT)
Dept: HEALTH INFORMATION MANAGEMENT | Facility: CLINIC | Age: 84
End: 2024-03-20
Payer: COMMERCIAL

## 2024-04-04 ENCOUNTER — TELEPHONE (OUTPATIENT)
Dept: AUDIOLOGY | Facility: CLINIC | Age: 84
End: 2024-04-04
Payer: COMMERCIAL

## 2024-04-04 NOTE — TELEPHONE ENCOUNTER
"Veda declined first available appt for CI eval. Said she is \"not quite ready\" and that her daughter has CI Coordinator's card and will call to schedule. I confirmed callback # and that was fine plan.    -Chelsea COLE 4/4/24  "

## 2024-04-04 NOTE — TELEPHONE ENCOUNTER
Spoke with Veda - she was currently out and asked for callback at 3:00pm. Writer agreed.    -Chelsea COLE 4/4/24

## 2024-04-11 ENCOUNTER — MEDICAL CORRESPONDENCE (OUTPATIENT)
Dept: HEALTH INFORMATION MANAGEMENT | Facility: CLINIC | Age: 84
End: 2024-04-11
Payer: COMMERCIAL

## 2024-04-11 NOTE — TELEPHONE ENCOUNTER
Spoke with Veda after receiving message via [email or web address] that she was reaching out to schedule CIE. Appt scheduled, will mail visit reminder w/CI folder. Said she can attend by herself or with anyone (she's not sure if daughter can attend).     Also noted that Veda should get an order for aud appt from primary care prvdr. Gave info - she agreed.     -Chelsea COLE 4/11/24

## 2024-04-17 NOTE — TELEPHONE ENCOUNTER
FUTURE VISIT INFORMATION      FUTURE VISIT INFORMATION:  Date: 5/9/24  Time: 1:00pm  Location: [facility]  REFERRAL INFORMATION:  Reason for visit/diagnosis  cie    RECORDS REQUESTED FROM:       Clinic name Comments Records Status Imaging Status   [employer] scanned under 3/20/24, 3/11/19 EPIC

## 2024-05-09 ENCOUNTER — OFFICE VISIT (OUTPATIENT)
Dept: AUDIOLOGY | Facility: CLINIC | Age: 84
End: 2024-05-09
Payer: COMMERCIAL

## 2024-05-09 ENCOUNTER — PRE VISIT (OUTPATIENT)
Dept: AUDIOLOGY | Facility: CLINIC | Age: 84
End: 2024-05-09

## 2024-05-09 DIAGNOSIS — H90.6 MIXED HEARING LOSS, BILATERAL: Primary | ICD-10-CM

## 2024-05-09 PROCEDURE — 92626 EVAL AUD FUNCJ 1ST HOUR: CPT | Performed by: AUDIOLOGIST

## 2024-05-09 NOTE — PROGRESS NOTES
AUDIOLOGY REPORT  SUBJECTIVE: Elvis Cruz, 83 year old female, was seen in the Audiology Clinic at [facility] on 5/9/2024 to assess audiologic candidacy for a cochlear implant. She was accompanied by her daughter. Elvis has a diagnosis of moderate to severe mixed hearing loss (4-frequency puretone average at 500, 1000, 2000, and 3000 Hz in the moderately severe range) for the right ear and moderately severe to profound unmeasurable mixed hearing loss (4-frequency puretone average at 500, 1000, 2000, and 3000 Hz in the severe range) for the left ear.    Onset of hearing loss: In her 30s  Identification of hearing loss: In her 40s  Etiology of hearing loss: Possible otosclerosis   Sudden or Progressive: Progressive  Age Hearing Aid fit: In her 40s  Duration of Hearing Aid use: Consistently since first fit  Current Hearing Aid Type: Bilateral [place] hearing aids  Age of current Hearing Aid: 2 years old  Tinnitus: Absent  Balance: Dizziness and imbalance for which she utilizes a walker  Other: Heart condition (unsure exact type), back/leg pain, undiagnosed cough  Does patient exhibit intelligible vocalizations? Yes  Primary Mode of Communication: Oral/aural/lipreading   Previous Ear Surgeries: Stapes surgery for sure on the left ear and possibly on the right ear as well by Dr. Wilcox in her 50s (unsure of exact type)  Previous Other Surgeries: Sinus, spine, breast, hysterectomy, foot surgeries    OBJECTIVE: To evaluate candidacy for cochlear implant. Candidacy requires at least a moderate to profound sensorineural hearing loss in at least one ear; good general health; lack of benefit from conventional amplification, as determined from the tests below; psychological/emotional stability and motivationally suitable, with realistic expectations; and absence of medical conditions contraindicating surgical intervention.     The Cochlear Implant  Quality of Life-10 Global measure is a patient-reported outcome measure  developed to provide an overall assessment of quality of life before and after cochlear implantation by answering 10 questions related to hearing, listening and communication in daily life.  A higher score is less perceived difficulty. Pre operative score score is 26 out of a total possible score of 50    Tinnitus Handicap Inventory: Pre operative score is 0 out of a total possible score of 100    Otoscopy revealed ears are clear of cerumen bilaterally.     Tympanograms: Did not test, as unsure of her surgical history    Reflexes (reported by stimulus ear): Did not test, as unsure of her surgical history    Distortion product otoacoustic emissions (DPOAEs) were performed from 2311-9509 Hz and were absent bilaterally    Devices used for Aided Testing: Bilateral [employer] B90-UP hearing aids with comply tips    Electroacoustic Test Results: The patient's personal hearing aids were not meeting NAL-NL1 prescriptive targets when assessed using simulated real-ear measurements; therefore, loaner hearing aids were used for all testing    40 minutes were spent assessing the patient's auditory rehabilitation status in order to determine whether conventional amplification is beneficial or whether the patient is an audiologic candidate for a cochlear implant    Soundfield Thresholds:   Right aided: Detection in the mild to moderate range  Left aided: Detection in the mild to profound range    CNC Words Test: The patient repeats 50 single syllable words, auditory only. The words are presented at 60 dB A (conversational level) through a recording.     Right aided: 72% words, 91% phonemes  Left aided: 32% words, 61% phonemes    [employer] Sentences Test: The patient repeats 20 sentences, auditory only. The sentences are presented at 60 dB A (conversational level) delivered through a recording.       Right aided: 73% in quiet, 21% with +10 dB signal to  noise ratio (SNR)  Left aided: 35% in quiet  Bilaterally aided: 85% in quiet, 31% with +10 dB SNR    ASSESSMENT: Bilateral mixed hearing loss with limited benefit from conventional amplification for the left ear.     Ear recommended for implantation: Left. The patient is meeting FDA/Medicare candidacy criteria and is not receiving adequate speech understanding benefit from hearing aids for the left ear    Discussed FDA and Medicare candidacy guidelines and that Elvis is meeting criteria. Other items discussed included:    -Cochlear implant systems, including the internal and external components; how cochlear implants work and function differently than hearing aids; and the differences between acoustic and electric hearing.     -The cochlear implantation process, including pre-surgical visits and extensive follow-up programming appointments.     -Realistic expectations. Relearning to hear will be a slow process, as sound is different from acoustic hearing; time, practice, and programming is needed to optimize sound quality and speech understanding; hearing may never be ideal or as expected; and phone use, music appreciation, and understanding in background noise may be limited.    -Risk factors: Internal device failure; damage to the vestibular system and/or facial nerve; infection; possible loss of residual hearing on the implanted ear (although that should not affect performance outcomes); and all other risks reviewed on the Pre-Cochlear Implant Counseling handout.     Through patient interview during the consultation process, this patient demonstrated the cognitive ability to use auditory clues and a willingness to undergo an extended program of rehabilitation.    PLAN: Elvis has been diagnosed with moderate to severe mixed hearing loss for the right ear and moderately severe to profound unmeasurable mixed hearing loss for the left ear and is a candidate for cochlear implantation in the left ear. It is  recommended that she return to complete the remainder of the team evaluation: CT scan, surgeon visit, and device selection. Please contact the clinic at [phone] with questions regarding these results or recommendations.    Whit Soria, [facility]  Licensed Audiologist  [place] #[id number]

## 2024-05-21 DIAGNOSIS — H90.5 SNHL (SENSORINEURAL HEARING LOSS): Primary | ICD-10-CM

## 2024-07-23 ENCOUNTER — TELEPHONE (OUTPATIENT)
Dept: OTOLARYNGOLOGY | Facility: CLINIC | Age: 84
End: 2024-07-23
Payer: COMMERCIAL

## 2024-07-23 NOTE — TELEPHONE ENCOUNTER
Spoke with Veda. Scheduled her for surgical consult with ENT/Dr. Contreras and explained I'd move CT scan to [facility] location on same day she does ENT appt. Mailed visit reminder, she was happy about scheduling both appts same day. Sent contact card for questions.    LISA Weber Coordinator 7/23/24

## 2024-07-23 NOTE — TELEPHONE ENCOUNTER
[facility] Call Center    Phone Message    May a detailed message be left on voicemail: yes     Reason for Call: Other: Pt calling in regards to her previous appt for cochlear implant evaluation back in May. States she is unsure of the next steps. Writer did transfer pt to imaging to schedule order for CT scan. Please call to advise pt of next steps. Thanks.     Action Taken: Other: ENT    Travel Screening: Not Applicable     Date of Service:

## 2024-08-01 NOTE — PROGRESS NOTES
"  [facility] [address] [address]  Phone: [phone]  Fax: [phone]    Patient:  Elvis Cruz, Date of birth 1940  Date of Visit:  08/08/2024  Referring Provider Referred Self      Assessment & Plan      Elvis Cruz is a 83 year old female seen for left cochlear implant candidacy. She has a moderate right sensorineural hearing loss and left severe sensorineural hearing loss that is so severe that she receives no benefit from her left hearing aid.  A hearing aid trial has been conducted with no benefit.  There are measurable auditory thresholds indicating a functioning cochlear nerve.  The patient and the family are highly motivated to proceed with implantation and postoperative rehabilitation.  There is no evidence of middle ear disease.  Imaging shows a patent cochlea and cochlear nerve.  The devices discussed with the patient are all FDA approved devices.  Risks and benefits of implantation were discussed.  Risks include:  Expected loss of residual hearing, worsened tinnitus which may improve with activation of the device, dizziness, damage to the taste nerve, damage to the facial nerve, infection with need for explanation and reimplantation, device failure, and possible need for further surgery. It was also discussed that all implant recipients are at greater risk for meningitis and the need for pneumococcal vaccination.  It has been explained that there is no \"going back\" in regards to natural hearing. She has been encouraged to leave her right hearing aid out, and to practice using the device solely in order to train the brain to use the device, in the first three months after activation. Mastery of the implant's potential is gained through regular practice, and it has been explained that it may take a year or longer to fully appreciate the functionality of the implant although most people plateau around " 6 months. We discussed the potential for music appreciation. She had her questions about my experience answered. She had her questions answered and will continue to think about whether she would like to proceed. She will contact us if she would like to proceed here. She has multiple other consultations pending.    OSIRIS Cruz is a 83 year old female being seen as a new patient to discuss cochlear implant candidacy. She has a prior history of mixed hearing loss (bilateral). Today, she is accompanied by her . She has had bilateral hearing loss for many years, and her left was always been the worse hearing ear. She had a surgery by Dr. Wilcox on her left ear about 40 years ago and her hearing never returned to baseline. She thinks it was stapes surgery. Her family history includes her mother and her nephew having hearing loss.       Past Medical History:   Diagnosis Date    Anxiety     Arthritis     DJD (degenerative joint disease), lumbar     foraminal stenosis    GERD (gastroesophageal reflux disease)     Hepatitis, autoimmune (H)     Hyperlipidemia, mixed     Pulmonary embolism (H)     Restless leg        Past Surgical History:   Procedure Laterality Date    BACK SURGERY      CARDIAC SURGERY      pt has LINQ recorder, monitoring for arrthymias    GYN SURGERY      hysterectomy    OTHER SURGICAL HISTORY      sinus    OTHER SURGICAL HISTORY      back    OTHER SURGICAL HISTORY      cataract removal - bilateral    TONSILLECTOMY         Social History     Tobacco Use    Smoking status: Former     Current packs/day: 0.00     Types: Cigarettes     Quit date: 1976     Years since quittin.3    Smokeless tobacco: Never   Substance Use Topics    Alcohol use: No     Alcohol/week: 0.0 standard drinks of alcohol    Drug use: No       Physical examination:  Constitutional:  In no acute distress, appears stated age  Eyes:  Extraocular movements intact, no spontaneous nystagmus  Ears:  Both ears examined  under the microscope.  The canals were lined with healthy skin. The right tympanic membrane appeared healthy and intact without retraction, effusion, or inflammation. The left chorda tympani is visible, running under the posterior superior quadrant of the tympanic membrane. There is a little bit of scarring on the TM and no obvious prothesis is seen. The middle ear is aerated.   Respiratory:  No increased work of breathing, wheezing or stridor  Musculoskeletal:  Good upper extremity strength  Skin:  No rashes on the head and neck  Neurologic:  House Brackman 1/6 bilaterally, ambulating normally  Psychiatric:  Alert, normal affect, answering questions appropriately    Audiogram:  Audiogram and cochlear implant evaluation was independently reviewed. Outside audiogram showed right moderate sensorineural hearing loss with 60% speech discrimination, left severe sensorineural hearing loss with 24% speech discrimination.  Cochlear implant evaluation:  CNC: Right = 72% words, 91% phonemes; Left = 32% words, 61% phonemes  AzBio: Right = 73% in quiet, 21% in +10; Left = 35% quiet; Bilateral = 85% in quiet, 31% in +10  Rec: Meeting audiologic criteria for cochlear implant candidacy in left ear.       Imaging:  Imaging was independently reviewed. Both mastoids are well developed and aerated, middle ears aerated, ossicular chains appear intact with no evidence of a prosthesis on either side, otic capsules intact, facial nerves in their usual position.  CT TEMPORAL W/O CONTRAST (08/08/2024)   Provided History: SNHL (sensorineural hearing loss)  Impression:  Normal CT study of the temporal bones.     ADDENDUM:  Acoustic reflexes were tested after the visit. Present left ipsilateral and right contralateral reflex, absent right ipsilateral and left contralateral reflex.    Scribe Disclosure:   Cadence KENDALL, am serving as a scribe; to document services personally performed by Tamia Contreras MD -based on data collection and  the provider's statements to me.     Provider Disclosure:  I agree with above History, Review of Systems, Physical exam and Plan.  I have reviewed the content of the documentation and have edited it as needed. I have personally performed the services documented here and the documentation accurately represents those services and the decisions I have made.

## 2024-08-05 NOTE — TELEPHONE ENCOUNTER
FUTURE VISIT INFORMATION      FUTURE VISIT INFORMATION:  Date: 8/8/24  Time: 1 PM  Location: [facility] - ENT  REFERRAL INFORMATION:  Referring provider:    Referring providers clinic:    Reason for visit/diagnosis:  CI, CT prior - Hx Stapes surgery w/ Paparella     RECORDS REQUESTED FROM      Clinic name Comments Records Status Imaging Status   [facility] Audiology - [facility] 5/9/24 OV notes- Willow Das AuD  Epic    [employer]  Audio scanned under 3/20/24, 3/11/19  Epic    [facility] 1/15/2018 CT head brain CE Req 8/5/24  PACS           August 5, 2024 10:18 AM - Request for images pushed to [place] from [facility]  August 5, 2024 11:47 AM - Images received and resolved in PACS -[facility]

## 2024-08-08 ENCOUNTER — PRE VISIT (OUTPATIENT)
Dept: OTOLARYNGOLOGY | Facility: CLINIC | Age: 84
End: 2024-08-08

## 2024-08-08 ENCOUNTER — OFFICE VISIT (OUTPATIENT)
Dept: OTOLARYNGOLOGY | Facility: CLINIC | Age: 84
End: 2024-08-08
Payer: COMMERCIAL

## 2024-08-08 ENCOUNTER — OFFICE VISIT (OUTPATIENT)
Dept: AUDIOLOGY | Facility: CLINIC | Age: 84
End: 2024-08-08
Payer: COMMERCIAL

## 2024-08-08 ENCOUNTER — ANCILLARY PROCEDURE (OUTPATIENT)
Dept: CT IMAGING | Facility: CLINIC | Age: 84
End: 2024-08-08
Attending: OTOLARYNGOLOGY
Payer: COMMERCIAL

## 2024-08-08 VITALS — WEIGHT: 234 LBS | OXYGEN SATURATION: 97 % | BODY MASS INDEX: 36.73 KG/M2 | HEART RATE: 67 BPM | HEIGHT: 67 IN

## 2024-08-08 DIAGNOSIS — H90.6 MIXED CONDUCTIVE AND SENSORINEURAL HEARING LOSS OF BOTH EARS: Primary | ICD-10-CM

## 2024-08-08 DIAGNOSIS — H90.5 SNHL (SENSORINEURAL HEARING LOSS): ICD-10-CM

## 2024-08-08 DIAGNOSIS — H90.3 SENSORINEURAL HEARING LOSS (SNHL) OF BOTH EARS: Primary | ICD-10-CM

## 2024-08-08 PROCEDURE — 92504 EAR MICROSCOPY EXAMINATION: CPT | Performed by: OTOLARYNGOLOGY

## 2024-08-08 PROCEDURE — 70480 CT ORBIT/EAR/FOSSA W/O DYE: CPT | Performed by: RADIOLOGY

## 2024-08-08 PROCEDURE — 99204 OFFICE O/P NEW MOD 45 MIN: CPT | Mod: 25 | Performed by: OTOLARYNGOLOGY

## 2024-08-08 PROCEDURE — 92550 TYMPANOMETRY & REFLEX THRESH: CPT | Performed by: AUDIOLOGIST

## 2024-08-08 RX ORDER — MULTIVITAMIN WITH IRON
1 TABLET ORAL DAILY
COMMUNITY

## 2024-08-08 ASSESSMENT — PAIN SCALES - GENERAL: PAINLEVEL: MODERATE PAIN (5)

## 2024-08-08 NOTE — PROGRESS NOTES
AUDIOLOGY REPORT    SUMMARY: Audiology visit completed. See audiogram for results.      RECOMMENDATIONS: Follow-up with ENT.     Kym Lee M.A  Audiology Doctoral Extern  [place] #[id number]    I was present with the patient for the entire Audiology appointment including all procedures/testing performed by the AuD student, and agree with the student s assessment and plan as documented.    Whit Barakat CCC-A  Licensed Audiologist   [place] #[id number]

## 2024-08-08 NOTE — LETTER
"8/8/2024      RE: Elvis Cruz  [address]  [facility] [address]         [facility] [address] [address]  Phone: [phone]  Fax: [phone]    Patient:  Elvis Cruz, Date of birth 1940  Date of Visit:  08/08/2024  Referring Provider Referred Self      Assessment & Plan     Elvis Cruz is a 83 year old female seen for left cochlear implant candidacy. She has a moderate right sensorineural hearing loss and left severe sensorineural hearing loss that is so severe that she receives no benefit from her left hearing aid.  A hearing aid trial has been conducted with no benefit.  There are measurable auditory thresholds indicating a functioning cochlear nerve.  The patient and the family are highly motivated to proceed with implantation and postoperative rehabilitation.  There is no evidence of middle ear disease.  Imaging shows a patent cochlea and cochlear nerve.  The devices discussed with the patient are all FDA approved devices.  Risks and benefits of implantation were discussed.  Risks include:  Expected loss of residual hearing, worsened tinnitus which may improve with activation of the device, dizziness, damage to the taste nerve, damage to the facial nerve, infection with need for explanation and reimplantation, device failure, and possible need for further surgery. It was also discussed that all implant recipients are at greater risk for meningitis and the need for pneumococcal vaccination.  It has been explained that there is no \"going back\" in regards to natural hearing. She has been encouraged to leave her right hearing aid out, and to practice using the device solely in order to train the brain to use the device, in the first three months after activation. Mastery of the implant's potential is gained through regular practice, and it has been explained that it may take a year or " longer to fully appreciate the functionality of the implant although most people plateau around 6 months. We discussed the potential for music appreciation. She had her questions about my experience answered. She had her questions answered and will continue to think about whether she would like to proceed. She will contact us if she would like to proceed here. She has multiple other consultations pending.    OSIRIS Cruz is a 83 year old female being seen as a new patient to discuss cochlear implant candidacy. She has a prior history of mixed hearing loss (bilateral). Today, she is accompanied by her . She has had bilateral hearing loss for many years, and her left was always been the worse hearing ear. She had a surgery by Dr. Wilcox on her left ear about 40 years ago and her hearing never returned to baseline. She thinks it was stapes surgery. Her family history includes her mother and her nephew having hearing loss.       Past Medical History:   Diagnosis Date     Anxiety      Arthritis      DJD (degenerative joint disease), lumbar     foraminal stenosis     GERD (gastroesophageal reflux disease)      Hepatitis, autoimmune (H)      Hyperlipidemia, mixed      Pulmonary embolism (H)      Restless leg        Past Surgical History:   Procedure Laterality Date     BACK SURGERY       CARDIAC SURGERY      pt has LINQ recorder, monitoring for arrthymias     GYN SURGERY      hysterectomy     OTHER SURGICAL HISTORY      sinus     OTHER SURGICAL HISTORY      back     OTHER SURGICAL HISTORY      cataract removal - bilateral     TONSILLECTOMY         Social History     Tobacco Use     Smoking status: Former     Current packs/day: 0.00     Types: Cigarettes     Quit date: 1976     Years since quittin.3     Smokeless tobacco: Never   Substance Use Topics     Alcohol use: No     Alcohol/week: 0.0 standard drinks of alcohol     Drug use: No       Physical examination:  Constitutional:  In no acute  distress, appears stated age  Eyes:  Extraocular movements intact, no spontaneous nystagmus  Ears:  Both ears examined under the microscope.  The canals were lined with healthy skin. The right tympanic membrane appeared healthy and intact without retraction, effusion, or inflammation. The left chorda tympani is visible, running under the posterior superior quadrant of the tympanic membrane. There is a little bit of scarring on the TM and no obvious prothesis is seen. The middle ear is aerated.   Respiratory:  No increased work of breathing, wheezing or stridor  Musculoskeletal:  Good upper extremity strength  Skin:  No rashes on the head and neck  Neurologic:  House Brackman 1/6 bilaterally, ambulating normally  Psychiatric:  Alert, normal affect, answering questions appropriately    Audiogram:  Audiogram and cochlear implant evaluation was independently reviewed. Outside audiogram showed right moderate sensorineural hearing loss with 60% speech discrimination, left severe sensorineural hearing loss with 24% speech discrimination.  Cochlear implant evaluation:  CNC: Right = 72% words, 91% phonemes; Left = 32% words, 61% phonemes  AzBio: Right = 73% in quiet, 21% in +10; Left = 35% quiet; Bilateral = 85% in quiet, 31% in +10  Rec: Meeting audiologic criteria for cochlear implant candidacy in left ear.       Imaging:  Imaging was independently reviewed. Both mastoids are well developed and aerated, middle ears aerated, ossicular chains appear intact with no evidence of a prosthesis on either side, otic capsules intact, facial nerves in their usual position.  CT TEMPORAL W/O CONTRAST (08/08/2024)   Provided History: SNHL (sensorineural hearing loss)  Impression:  Normal CT study of the temporal bones.     ADDENDUM:  Acoustic reflexes were tested after the visit. Present left ipsilateral and right contralateral reflex, absent right ipsilateral and left contralateral reflex.    Scribe Disclosure:   Cadence KENDALL,  am serving as a scribe; to document services personally performed by Tamia Contreras MD -based on data collection and the provider's statements to me.     Provider Disclosure:  I agree with above History, Review of Systems, Physical exam and Plan.  I have reviewed the content of the documentation and have edited it as needed. I have personally performed the services documented here and the documentation accurately represents those services and the decisions I have made.        Tamia Contreras MD

## 2025-05-04 ENCOUNTER — HEALTH MAINTENANCE LETTER (OUTPATIENT)
Age: 85
End: 2025-05-04